# Patient Record
Sex: MALE | Race: WHITE | NOT HISPANIC OR LATINO | Employment: FULL TIME | URBAN - METROPOLITAN AREA
[De-identification: names, ages, dates, MRNs, and addresses within clinical notes are randomized per-mention and may not be internally consistent; named-entity substitution may affect disease eponyms.]

---

## 2019-03-25 ENCOUNTER — CONVERSION ENCOUNTER (OUTPATIENT)
Dept: FAMILY MEDICINE CLINIC | Facility: CLINIC | Age: 59
End: 2019-03-25

## 2019-03-25 ENCOUNTER — HOSPITAL ENCOUNTER (OUTPATIENT)
Dept: FAMILY MEDICINE CLINIC | Facility: CLINIC | Age: 59
Discharge: HOME | End: 2019-03-25
Attending: FAMILY MEDICINE

## 2019-03-25 ENCOUNTER — OFFICE VISIT CONVERTED (OUTPATIENT)
Dept: FAMILY MEDICINE CLINIC | Facility: CLINIC | Age: 59
End: 2019-03-25
Attending: FAMILY MEDICINE

## 2019-03-25 LAB
ALBUMIN SERPL-MCNC: 4.3 G/DL (ref 3.5–5)
ALBUMIN/GLOB SERPL: 1.5 {RATIO} (ref 1.4–2.6)
ALP SERPL-CCNC: 96 U/L (ref 56–119)
ALT SERPL-CCNC: 18 U/L (ref 10–40)
ANION GAP SERPL CALC-SCNC: 16 MMOL/L (ref 8–19)
AST SERPL-CCNC: 20 U/L (ref 15–50)
BASOPHILS # BLD AUTO: 0.05 10*3/UL (ref 0–0.2)
BASOPHILS NFR BLD AUTO: 0.7 % (ref 0–3)
BILIRUB SERPL-MCNC: 0.62 MG/DL (ref 0.2–1.3)
BUN SERPL-MCNC: 14 MG/DL (ref 5–25)
BUN/CREAT SERPL: 13 {RATIO} (ref 6–20)
CALCIUM SERPL-MCNC: 9.2 MG/DL (ref 8.7–10.4)
CHLORIDE SERPL-SCNC: 107 MMOL/L (ref 99–111)
CHOLEST SERPL-MCNC: 194 MG/DL (ref 107–200)
CHOLEST/HDLC SERPL: 5.4 {RATIO} (ref 3–6)
CONV ABS IMM GRAN: 0.02 10*3/UL (ref 0–0.2)
CONV CO2: 26 MMOL/L (ref 22–32)
CONV IMMATURE GRAN: 0.3 % (ref 0–1.8)
CONV TOTAL PROTEIN: 7.1 G/DL (ref 6.3–8.2)
CREAT UR-MCNC: 1.1 MG/DL (ref 0.7–1.2)
DEPRECATED RDW RBC AUTO: 42.6 FL (ref 35.1–43.9)
EOSINOPHIL # BLD AUTO: 0.09 10*3/UL (ref 0–0.7)
EOSINOPHIL # BLD AUTO: 1.2 % (ref 0–7)
ERYTHROCYTE [DISTWIDTH] IN BLOOD BY AUTOMATED COUNT: 12.6 % (ref 11.6–14.4)
GFR SERPLBLD BASED ON 1.73 SQ M-ARVRAT: >60 ML/MIN/{1.73_M2}
GLOBULIN UR ELPH-MCNC: 2.8 G/DL (ref 2–3.5)
GLUCOSE SERPL-MCNC: 109 MG/DL (ref 70–99)
HBA1C MFR BLD: 15.7 G/DL (ref 14–18)
HCT VFR BLD AUTO: 48.8 % (ref 42–52)
HDLC SERPL-MCNC: 36 MG/DL (ref 40–60)
LDLC SERPL CALC-MCNC: 137 MG/DL (ref 70–100)
LYMPHOCYTES # BLD AUTO: 2.24 10*3/UL (ref 1–5)
MCH RBC QN AUTO: 29.7 PG (ref 27–31)
MCHC RBC AUTO-ENTMCNC: 32.2 G/DL (ref 33–37)
MCV RBC AUTO: 92.2 FL (ref 80–96)
MONOCYTES # BLD AUTO: 0.51 10*3/UL (ref 0.2–1.2)
MONOCYTES NFR BLD AUTO: 7 % (ref 3–10)
NEUTROPHILS # BLD AUTO: 4.35 10*3/UL (ref 2–8)
NEUTROPHILS NFR BLD AUTO: 59.9 % (ref 30–85)
NRBC CBCN: 0 % (ref 0–0.7)
OSMOLALITY SERPL CALC.SUM OF ELEC: 301 MOSM/KG (ref 273–304)
PLATELET # BLD AUTO: 248 10*3/UL (ref 130–400)
PMV BLD AUTO: 10.9 FL (ref 9.4–12.4)
POTASSIUM SERPL-SCNC: 4.4 MMOL/L (ref 3.5–5.3)
RBC # BLD AUTO: 5.29 10*6/UL (ref 4.7–6.1)
SODIUM SERPL-SCNC: 145 MMOL/L (ref 135–147)
TRIGL SERPL-MCNC: 106 MG/DL (ref 40–150)
VARIANT LYMPHS NFR BLD MANUAL: 30.9 % (ref 20–45)
VLDLC SERPL-MCNC: 21 MG/DL (ref 5–37)
WBC # BLD AUTO: 7.26 10*3/UL (ref 4.8–10.8)

## 2019-08-19 ENCOUNTER — OFFICE VISIT CONVERTED (OUTPATIENT)
Dept: FAMILY MEDICINE CLINIC | Facility: CLINIC | Age: 59
End: 2019-08-19
Attending: NURSE PRACTITIONER

## 2020-10-20 ENCOUNTER — OFFICE VISIT CONVERTED (OUTPATIENT)
Dept: FAMILY MEDICINE CLINIC | Facility: CLINIC | Age: 60
End: 2020-10-20
Attending: FAMILY MEDICINE

## 2020-10-20 ENCOUNTER — HOSPITAL ENCOUNTER (OUTPATIENT)
Dept: FAMILY MEDICINE CLINIC | Facility: CLINIC | Age: 60
Discharge: HOME | End: 2020-10-20
Attending: FAMILY MEDICINE

## 2020-10-20 LAB
ALBUMIN SERPL-MCNC: 4.2 G/DL (ref 3.5–5)
ALBUMIN/GLOB SERPL: 1.4 {RATIO} (ref 1.4–2.6)
ALP SERPL-CCNC: 108 U/L (ref 56–155)
ALT SERPL-CCNC: 21 U/L (ref 10–40)
ANION GAP SERPL CALC-SCNC: 18 MMOL/L (ref 8–19)
AST SERPL-CCNC: 23 U/L (ref 15–50)
BASOPHILS # BLD AUTO: 0.04 10*3/UL (ref 0–0.2)
BASOPHILS NFR BLD AUTO: 0.5 % (ref 0–3)
BILIRUB SERPL-MCNC: 0.62 MG/DL (ref 0.2–1.3)
BUN SERPL-MCNC: 16 MG/DL (ref 5–25)
BUN/CREAT SERPL: 15 {RATIO} (ref 6–20)
CALCIUM SERPL-MCNC: 9.3 MG/DL (ref 8.7–10.4)
CHLORIDE SERPL-SCNC: 106 MMOL/L (ref 99–111)
CHOLEST SERPL-MCNC: 194 MG/DL (ref 107–200)
CHOLEST/HDLC SERPL: 5.4 {RATIO} (ref 3–6)
CONV ABS IMM GRAN: 0.02 10*3/UL (ref 0–0.2)
CONV CO2: 24 MMOL/L (ref 22–32)
CONV IMMATURE GRAN: 0.2 % (ref 0–1.8)
CONV RHEUMATOID FACTOR IGM: <10 [IU]/ML (ref 0–14)
CONV TOTAL PROTEIN: 7.2 G/DL (ref 6.3–8.2)
CREAT UR-MCNC: 1.06 MG/DL (ref 0.7–1.2)
DEPRECATED RDW RBC AUTO: 42.4 FL (ref 35.1–43.9)
EOSINOPHIL # BLD AUTO: 0.13 10*3/UL (ref 0–0.7)
EOSINOPHIL # BLD AUTO: 1.6 % (ref 0–7)
ERYTHROCYTE [DISTWIDTH] IN BLOOD BY AUTOMATED COUNT: 12.4 % (ref 11.6–14.4)
GFR SERPLBLD BASED ON 1.73 SQ M-ARVRAT: >60 ML/MIN/{1.73_M2}
GLOBULIN UR ELPH-MCNC: 3 G/DL (ref 2–3.5)
GLUCOSE SERPL-MCNC: 114 MG/DL (ref 70–99)
HCT VFR BLD AUTO: 48.9 % (ref 42–52)
HDLC SERPL-MCNC: 36 MG/DL (ref 40–60)
HGB BLD-MCNC: 15.8 G/DL (ref 14–18)
LDLC SERPL CALC-MCNC: 132 MG/DL (ref 70–100)
LYMPHOCYTES # BLD AUTO: 2.2 10*3/UL (ref 1–5)
LYMPHOCYTES NFR BLD AUTO: 26.5 % (ref 20–45)
MCH RBC QN AUTO: 29.8 PG (ref 27–31)
MCHC RBC AUTO-ENTMCNC: 32.3 G/DL (ref 33–37)
MCV RBC AUTO: 92.3 FL (ref 80–96)
MONOCYTES # BLD AUTO: 0.63 10*3/UL (ref 0.2–1.2)
MONOCYTES NFR BLD AUTO: 7.6 % (ref 3–10)
NEUTROPHILS # BLD AUTO: 5.29 10*3/UL (ref 2–8)
NEUTROPHILS NFR BLD AUTO: 63.6 % (ref 30–85)
NRBC CBCN: 0 % (ref 0–0.7)
OSMOLALITY SERPL CALC.SUM OF ELEC: 298 MOSM/KG (ref 273–304)
PLATELET # BLD AUTO: 242 10*3/UL (ref 130–400)
PMV BLD AUTO: 10.4 FL (ref 9.4–12.4)
POTASSIUM SERPL-SCNC: 5 MMOL/L (ref 3.5–5.3)
PSA SERPL-MCNC: 0.86 NG/ML (ref 0–4)
RBC # BLD AUTO: 5.3 10*6/UL (ref 4.7–6.1)
SODIUM SERPL-SCNC: 143 MMOL/L (ref 135–147)
TRIGL SERPL-MCNC: 128 MG/DL (ref 40–150)
URATE SERPL-MCNC: 6.5 MG/DL (ref 3.5–8.5)
VLDLC SERPL-MCNC: 26 MG/DL (ref 5–37)
WBC # BLD AUTO: 8.31 10*3/UL (ref 4.8–10.8)

## 2020-10-21 LAB
DSDNA AB SER-ACNC: NEGATIVE [IU]/ML
ENA AB SER IA-ACNC: NEGATIVE {RATIO}

## 2020-11-16 ENCOUNTER — CONVERSION ENCOUNTER (OUTPATIENT)
Dept: GASTROENTEROLOGY | Facility: CLINIC | Age: 60
End: 2020-11-16
Attending: INTERNAL MEDICINE

## 2021-04-23 ENCOUNTER — HOSPITAL ENCOUNTER (OUTPATIENT)
Dept: GASTROENTEROLOGY | Facility: HOSPITAL | Age: 61
Setting detail: HOSPITAL OUTPATIENT SURGERY
Discharge: HOME | End: 2021-04-23
Attending: INTERNAL MEDICINE

## 2021-05-07 NOTE — PROGRESS NOTES
Progress Note      Patient Name: Sumeet Gallego   Patient ID: [id number]   Sex: Male   YOB: 1960        Visit Date: March 25, 2019    Provider: Edwin Mcghee MD   Location: [facility]   Location Address: [address] [place] [address]  [id number]   Location Phone: [phone]          Chief Complaint     To be established       History Of Present Illness  Smueet Gallego is a 59 year old /White male who presents for evaluation and treatment of:      pt needs to get established  pt had PSA checked in Sept 2018- normal  last colonoscopy- 2017- repeat in 2020  HTN- uncontrolled       Past Medical History  Disease Name Date Onset Notes   Kidney stones --  --    Seasonal allergies --  --    Sleep apnea --  --          Medication List  Name Date Started Instructions   aspirin 325 mg oral tablet  take 1 tablet (325 mg) by oral route once daily   Prevacid 30 mg oral capsule,delayed release(DR/EC)  take 1 capsule (30 mg) by oral route once daily before a meal   sildenafil 50 mg oral tablet  take 1 tablet (50 mg) by oral route once daily as needed approximately 1 hour before sexual activity         Allergy List  Allergen Name Date Reaction Notes   NO KNOWN DRUG ALLERGIES --  --  --          Family Medical History  Disease Name Relative/Age Notes   DM Type I Mother/  Sister/   Mother; Sister   Hyperlipidemia Mother/   Mother   Hypertension Mother/   Mother   Arthrtis Mother/   Mother         Social History  Finding Status Start/Stop Quantity Notes   Alcohol Current - status unknown --/-- --  7 or less drinks per week   Tobacco Never --/-- --  --    Uses seatbelts --  --/-- --  yes         Review of Systems  · Constitutional  o Denies  o : fatigue, fever  · Cardiovascular  o Denies  o : chest pain, palpitations  · Respiratory  o Denies  o : shortness of breath, cough  · Gastrointestinal  o Denies  o : nausea, vomiting, diarrhea  · Psychiatric  o Denies  o :  "anxiety, depression      Vitals  Date Time BP Position Site L\R Cuff Size HR RR TEMP (F) WT  HT  BMI kg/m2 BSA m2 O2 Sat HC       03/25/2019 10:54 /98 Sitting    80 - R  97.8 272lbs 4oz 5'  10\" 39.06 2.47 95 %    03/25/2019 11:26 /86 Sitting                     Physical Examination  · Constitutional  o Appearance  o : well developed, well-nourished, in no acute distress  · Respiratory  o Respiratory Effort  o : breathing unlabored  o Auscultation of Lungs  o : clear to ascultation  · Cardiovascular  o Heart  o :   § Auscultation of Heart  § : regular rate and rhythm  o Peripheral Vascular System  o :   § Extremities  § : no edema  · Gastrointestinal  o Abdomen  o : soft, non-tender, non-distended, + bowel sounds, no hepatosplenomegaly, no masses palpated  · Musculoskeletal  o General  o :   § General Musculoskeletal  § : No joint swelling or deformity., Muscle tone, strength, and development grossly normal.  · Neurologic  o Gait and Station  o :   § Gait Screening  § : normal gait  · Psychiatric  o Mood and Affect  o : mood normal, affect appropriate          Assessment  · Essential hypertension     401.9/I10      Plan  · Orders  o CBC with Auto Diff [facility] (30606) - 401.9/I10 - 03/25/2019  o CMP [facility] (30066) - 401.9/I10 - 03/25/2019  o Lipid Panel [facility] (50437) - 401.9/I10 - 03/25/2019  o ACO-39: Current medications updated and reviewed () - - 03/25/2019  · Medications  o lisinopril 10 mg oral tablet   SIG: take 1 tablet (10 mg) by oral route once daily for 90 days   DISP: (90) tablets with 1 refills  Prescribed on 03/25/2019     · Instructions  o Patient was educated/instructed on their diagnosis, treatment and medications prior to discharge from the clinic today.            Electronically Signed by: Edwin Mcghee MD -Author on March 25, 2019 11:30:00 AM  "

## 2021-05-07 NOTE — PROGRESS NOTES
Progress Note      Patient Name: Sumeet Gallego   Patient ID: [id number]   Sex: Male   YOB: 1960        Visit Date: August 19, 2019    Provider: CAROLEE Murphy   Location: [facility] [place]   Location Address: [address] [place] [address]   [place]  [id number]   Location Phone: [phone]          Chief Complaint     PATIENT IS HERE FOR COUGHING AND WHEEZING.      THIS HAS BEEN GOING ON FOR AT LEAST A MONTH.      HE HAS TRIED MANY OVER THE COUNTER COUGH AND COLD MEDICATION BUT NOTHING IS WORKING.      HE SAID HE IS NOT ABLE TO SLEEP AT NIGHT BECAUSE OF ALL THE COUGHING.       History Of Present Illness  Sumeet Gallego is a 59 year old /White male who presents for evaluation and treatment of:      URI s/s on and off x 1 month used OTC not helping--    pt no longer on the Lisinopril for his BP_-and actually a productive cough and even wheezes as well--  pt also with the Hx of the allergy induced asthma--and took the shots x 14 yrs--           Past Medical History  Disease Name Date Onset Notes   Kidney stones --  --    Seasonal allergies --  --    Sleep apnea --  --          Medication List  Name Date Started Instructions   aspirin 325 mg oral tablet  take 1 tablet (325 mg) by oral route once daily   lisinopril 10 mg oral tablet 03/25/2019 take 1 tablet (10 mg) by oral route once daily for 90 days   Prevacid 30 mg oral capsule,delayed release(DR/EC)  take 1 capsule (30 mg) by oral route once daily before a meal   sildenafil 50 mg oral tablet 04/04/2019 take 1 tablet (50 mg) by oral route once daily as needed approximately 1 hour before sexual activity for 30 days         Allergy List  Allergen Name Date Reaction Notes   NO KNOWN DRUG ALLERGIES --  --  --          Family Medical History  Disease Name Relative/Age Notes   DM Type I Mother/  Sister/   Mother; Sister   Hyperlipidemia Mother/   Mother   Hypertension Mother/   Mother   Arthrtis Mother/   Mother  "        Social History  Finding Status Start/Stop Quantity Notes   Alcohol Current - status unknown --/-- --  7 or less drinks per week   Tobacco Never --/-- --  --    Uses seatbelts --  --/-- --  yes         Review of Systems  · Constitutional  o Denies  o : fatigue, fever  · HENT  o Admits  o : sinus pain, nasal congestion, nasal discharge, postnasal drip  · Cardiovascular  o Denies  o : chest pain, irregular heart beats  · Respiratory  o Admits  o : shortness of breath, wheezing, cough, abnormal sputum production, productive cough, asthma or wheezing  · Gastrointestinal  o Denies  o : nausea, vomiting  · Heme-Lymph  o Denies  o : petechiae, lymph node enlargement or tenderness  · Allergic-Immunologic  o Denies  o : frequent illnesses      Vitals  Date Time BP Position Site L\R Cuff Size HR RR TEMP (F) WT  HT  BMI kg/m2 BSA m2 O2 Sat HC       08/19/2019 03:48 /82 Sitting    90 - R  97.8 275lbs 3oz 5'  10\" 39.48 2.48 93 %          Physical Examination  · Constitutional  o Appearance  o : well developed, well-nourished, in no acute distress  · Head and Face  o HEENT  o : Unremarkable no sinus tenderness today but red right TM--  · Eyes  o Conjunctivae  o : conjunctivae normal  · Neck  o Inspection/Palpation  o : supple  o Thyroid  o : no thyromegaly  · Respiratory  o Respiratory Effort  o : breathing unlabored  o Auscultation of Lungs  o : clear to ascultation  · Cardiovascular  o Heart  o :   § Auscultation of Heart  § : regular rate and rhythm  o Peripheral Vascular System  o :   § Extremities  § : no edema  · Gastrointestinal  o Abdominal Examination  o :   § Abdomen  § : soft  · Lymphatic  o Neck  o : no lymphadenopathy present  · Musculoskeletal  o General  o :   § General Musculoskeletal  § : No joint swelling or deformity., Muscle tone, strength, and development grossly normal.  · Skin and Subcutaneous Tissue  o General Inspection  o : skin turgor normal, texture normal  · Neurologic  o Gait and " Station  o :   § Gait Screening  § : normal gait  · Psychiatric  o Mood and Affect  o : mood normal, affect appropriate          Results     reviewed the labs       Assessment  · Allergic rhinitis due to allergen     477.9/J30.9  · Bronchitis, acute     466.0/J20.9  · Allergy-induced asthma     493.00/J45.909      Plan  · Orders  o ACO-39: Current medications updated and reviewed () - - 08/19/2019  · Medications  o Medrol (Duarte) 4 mg oral tablets,dose pack   SIG: take as directed for 6 days   DISP: (1) 21 ct dose-pack with 0 refills  Prescribed on 08/19/2019     o amoxicillin 875 mg oral tablet   SIG: take 1 tablet (875 mg) by oral route every 12 hours for 10 days   DISP: (20) tablets with 0 refills  Prescribed on 08/19/2019     o Ventolin HFA 90 mcg/actuation inhalation HFA aerosol inhaler   SIG: inhale 1 - 2 puffs (90 - 180 mcg) by inhalation route every 6 hours as needed for 30 days   DISP: (1) 8 gm canister with 0 refills  Prescribed on 08/19/2019     o Singulair 10 mg oral tablet   SIG: take 1 tablet (10 mg) by oral route once daily in the evening for 14 days   DISP: (14) tablets with 0 refills  Prescribed on 08/19/2019     · Instructions  o Take all medications as prescribed/directed.  o Rest. Increase Fluids.  o Patient was educated/instructed on their diagnosis, treatment and medications prior to discharge from the clinic today.  o Patient instructed to seek medical attention urgently for new or worsening symptoms.  o Call the office with any concerns or questions.  o Chronic conditions reviewed and taken into consideration for today's treatment plan.  · Disposition  o Call or Return if symptoms worsen or persist.            Electronically Signed by: CAROLEE Murphy -Author on August 19, 2019 04:28:36 PM

## 2021-05-07 NOTE — PROGRESS NOTES
Progress Note      Patient Name: Sumeet Gallego   Patient ID: [id number]   Sex: Male   YOB: 1960    Referring Provider: Edwin Mcghee MD    Visit Date: October 20, 2020    Provider: Edwin Mcghee MD   Location: [facility]   Location Address: [address] [place] [address]   [place]  [address]   Location Phone: [phone]          Chief Complaint     Rt knee pain for about a year       History Of Present Illness  Sumeet Gallego is a 60 year old /White male who presents for evaluation and treatment of:      HTN- uncontrolled- pt has been off lisinopril for long period of time  pt has arthritis- chronic x several months  right knee pain x 1 yr- no known injury- aching pain with certain movements of knee  pt needs colonoscopy this year- last one was 3 yrs ago- needed to get new one this year, per GI  xrays:no fxs       Past Medical History  Disease Name Date Onset Notes   Kidney stones --  --    Seasonal allergies --  --    Sleep apnea --  --          Medication List  Name Date Started Instructions   aspirin 325 mg oral tablet  take 1 tablet (325 mg) by oral route once daily   lisinopril 10 mg oral tablet 10/20/2020 take 1 tablet (10 mg) by oral route once daily for 90 days   Viagra 100 mg oral tablet 10/20/2020 take 1 tablet (100 mg) by oral route once daily as needed approximately 1 hour before sexual activity for 90 days         Allergy List  Allergen Name Date Reaction Notes   NO KNOWN DRUG ALLERGIES --  --  --          Family Medical History  Disease Name Relative/Age Notes   DM Type I Mother/  Sister/   Mother; Sister   Hyperlipidemia Mother/   Mother   Hypertension Mother/   Mother   Arthrtis Mother/   Mother         Social History  Finding Status Start/Stop Quantity Notes   Alcohol Current - status unknown --/-- --  7 or less drinks per week   Tobacco Never --/-- --  --    Uses seatbelts --  --/-- --  yes         Review of  "Systems  · Constitutional  o Denies  o : fatigue, fever  · Cardiovascular  o Denies  o : chest pain, palpitations  · Respiratory  o Denies  o : shortness of breath, cough  · Gastrointestinal  o Denies  o : nausea, vomiting, diarrhea  · Musculoskeletal  o Admits  o : joint pain, knee pain  · Psychiatric  o Denies  o : anxiety, depression      Vitals  Date Time BP Position Site L\R Cuff Size HR RR TEMP (F) WT  HT  BMI kg/m2 BSA m2 O2 Sat FR L/min FiO2 HC       10/20/2020 08:43 /90 Sitting    83 - R  97.7 271lbs 0oz 5'  9\" 40.02 2.45 94 %            Physical Examination  · Constitutional  o Appearance  o : well developed, well-nourished, in no acute distress  · Respiratory  o Respiratory Effort  o : breathing unlabored  o Auscultation of Lungs  o : clear to ascultation  · Cardiovascular  o Heart  o :   § Auscultation of Heart  § : regular rate and rhythm  o Peripheral Vascular System  o :   § Extremities  § : no edema  · Gastrointestinal  o Abdomen  o : soft, non-tender, non-distended, + bowel sounds, no hepatosplenomegaly, no masses palpated  · Musculoskeletal  o General  o :   § General Musculoskeletal  § : No joint swelling or deformity., Muscle tone, strength, and development grossly normal. Right knee medial tenderness, mild crepitus, normal ROM, stable, no redness, warmth. mild swelling.  · Neurologic  o Gait and Station  o :   § Gait Screening  § : normal gait  · Psychiatric  o Mood and Affect  o : mood normal, affect appropriate          Assessment  · Essential hypertension     401.9/I10  · Screen for colon cancer     V76.51/Z12.11  · Arthritis     716.90/M19.90  · Screening PSA (prostate specific antigen)     V76.44/Z12.5  · Right knee pain     719.46/M25.561      Plan  · Orders  o CBC with Auto Diff [facility] (17506) - 401.9/I10 - 10/20/2020  o CMP [facility] (74117) - 401.9/I10 - 10/20/2020  o Lipid Panel [facility] (63231) - 401.9/I10 - 10/20/2020  o Flucelvax Quadrivalent Syringes [facility] (47106) - - 10/21/2020  o ACO-14: " Influenza immunization administered or previously received () - - 10/21/2020  o ACO-39: Current medications updated and reviewed (1159F, ) - - 10/20/2020  o Gastroenterology Consultation (GASTR) - V76.51/Z12.11 - 10/20/2020   needs screening colonoscopy- no changes in stool, per pt  o PSA Ultrasensitive, ANNUAL SCREENING [facility] (12490) - V76.44/Z12.5 - 10/20/2020  o Fluzone Quadrivalent Vaccine, age 3+ (55740) - - 10/20/2020   Vaccine - Influenza; Dose: 0.5; Site: Right Upper Arm; Route: Intramuscular; Date: 10/21/2020 10:06:00; Exp: 06/30/2021; Lot: [id number]; Mfg: sanofi pasteur; TradeName: FLUZONE; Administered By: Dora Pineda MA; Comment: NDC#[phone]   GIVEN 10/20/2020 [facility]   Vaccine - Influenza; Dose: 0.5; Site: Right Upper Arm; Route: Intramuscular; Date: 10/21/2020 10:06:00; Exp: 06/30/2021; Lot: [id number]; Mfg: sanofi pasteur; TradeName: FLUZONE; Administered By: Dora Pineda MA; Comment: NDC#[phone]  o Xray knee right [facility] (25582-ZR) - 719.46/M25.561 - 10/20/2020  o ORTHOPEDIC CONSULTATION (ORTHO) - 719.46/M25.561 - 10/20/2020  o ELEUTERIO (antinuclear antibody profile) by enzyme immunoassay (40312) - 716.90/M19.90 - 10/20/2020  o Rheumatoid Factor IgM Quantitative [facility] (26176) - 716.90/M19.90 - 10/20/2020  o Uric Acid Serum [facility] (33349) - 716.90/M19.90 - 10/20/2020  · Medications  o Viagra 100 mg oral tablet   SIG: take 1 tablet (100 mg) by oral route once daily as needed approximately 1 hour before sexual activity for 90 days   DISP: (36) Tablet with 3 refills  Prescribed on 10/20/2020     o Mobic 15 mg oral tablet   SIG: take 1 tablet (15 mg) by oral route once daily for 14 days   DISP: (14) Tablet with 0 refills  Prescribed on 10/20/2020     o lisinopril 10 mg oral tablet   SIG: take 1 tablet (10 mg) by oral route once daily for 90 days   DISP: (90) Tablet with 1 refills  Adjusted on 10/20/2020     o sildenafil 50 mg oral tablet   SIG: take 1 tablet (50 mg) by oral route once daily  as needed approximately 1 hour before sexual activity for 30 days   DISP: (18) tablet with 0 refills  Discontinued on 10/20/2020     o Medications have been Reconciled  o Transition of Care or Provider Policy  · Instructions  o Handouts were given to patient: FLU VACCINE VIS HANDOUT  o Patient was educated/instructed on their diagnosis, treatment and medications prior to discharge from the clinic today.  o Pt given knee brace hinged.            Electronically Signed by: Edwin Mcghee MD -Author on October 21, 2020 03:54:42 PM

## 2021-05-09 VITALS
BODY MASS INDEX: 38.98 KG/M2 | HEIGHT: 70 IN | TEMPERATURE: 97.8 F | DIASTOLIC BLOOD PRESSURE: 98 MMHG | HEART RATE: 80 BPM | SYSTOLIC BLOOD PRESSURE: 140 MMHG | SYSTOLIC BLOOD PRESSURE: 140 MMHG | WEIGHT: 272.25 LBS | DIASTOLIC BLOOD PRESSURE: 86 MMHG | OXYGEN SATURATION: 95 %

## 2021-05-09 VITALS
DIASTOLIC BLOOD PRESSURE: 82 MMHG | OXYGEN SATURATION: 93 % | HEIGHT: 70 IN | SYSTOLIC BLOOD PRESSURE: 142 MMHG | WEIGHT: 275.18 LBS | TEMPERATURE: 97.8 F | HEART RATE: 90 BPM | BODY MASS INDEX: 39.4 KG/M2

## 2021-05-09 VITALS
WEIGHT: 271 LBS | OXYGEN SATURATION: 94 % | HEIGHT: 69 IN | TEMPERATURE: 97.7 F | SYSTOLIC BLOOD PRESSURE: 150 MMHG | DIASTOLIC BLOOD PRESSURE: 90 MMHG | HEART RATE: 83 BPM | BODY MASS INDEX: 40.14 KG/M2

## 2021-05-10 NOTE — H&P
History and Physical      Patient Name: Sumeet Gallego   Patient ID: [id number]   Sex: Male   YOB: 1960        Visit Date: November 16, 2020    Provider: Kamran Camacho MD   [facility]   Location Address: [address] [place] [address]  [id number]   Location Phone: [phone]          Chief Complaint  · Surgical History and Physical  · Screening Colonoscopy      History Of Present Illness  NON-INPATIENT HISTORY AND PHYSICAL  Allergies: NO KNOWN DRUG ALLERGIES   Chief Complaint/History of Present Illness: Colon screening, History of colon polyps TA, No family history of colon cancer   Colon Recall: Yes How Long: 3 years   Failed Outpatient Treatment/Contraindications: N/A   Current Medications: aspirin 81 mg oral tablet,delayed release (DR/EC), lisinopril 10 mg oral tablet, MoviPrep 100-7.5-2.691 gram oral powder in packet, multivitamin oral tablet, and Viagra 25 mg oral tablet   Significant Past Medical History: *No Pertinent Past Medical History   Significant Family Medical History: No family history of colon cancer   Significant Past Surgical History: Colonoscopy   Previous Colonoscopy: Yes YEAR: 2017 By Whom: Kamran Camacho MD   Previous EGD: No   PHYSICAL EXAM:  Heart: Regular Rate and Rhythm   Lungs: Breathing Unlabored           Assessment  · Preoperative examination     V72.84/Z01.818  · Screening for colon cancer     V76.51/Z12.11      Plan  · Orders  o Consent for Colonoscopy Screening -Possible risk/complications, benefits, and alternatives to surgical or invasive procedure have been explained to patient and/or legal gaurdian. -Patient has been evaluated and can tolerate anethesia and/or sedation. Risk, benefits, and alternatives to anesthesia and sedation have been explained to patient or legal gaurdian. () - V72.84/Z01.818, V76.51/Z12.11 - 01/08/2021  · Medications  o Medications have been Reconciled  o Transition of Care or Provider  Policy  · Instructions  o ****Surgical Orders****  o ***************  o Outpatient  o ***************  o RISK AND BENEFITS:  o Possible risks/complications, benefits and alternatives to surgical or invasive procedure have been explained to the patient and/or legal guardian.  o Patient has been evaluated and can tolerate anesthesia and/or sedation. Risks, benefits, and alternatives to anesthesia and sedation have been explained to the patient and/or legal guardian.  o ***************  o PREP: Per protocol  o IV: Per Anesthesia  o The above History and Physical Examination has been completed within 30 days of admission.  o This note has been transcribed by MADONNA Greco MA. I have read and agree with the findings in this note.  o Electronically Identified Patient Education Materials Provided Electronically  · Disposition  o Call or Return if symptoms worsen or persist.            Electronically Signed by: Alana Rodriguez MA -Author on November 16, 2020 10:27:41 AM

## 2021-07-06 ENCOUNTER — OFFICE VISIT (OUTPATIENT)
Dept: FAMILY MEDICINE CLINIC | Facility: CLINIC | Age: 61
End: 2021-07-06

## 2021-07-06 VITALS
WEIGHT: 275 LBS | SYSTOLIC BLOOD PRESSURE: 180 MMHG | DIASTOLIC BLOOD PRESSURE: 75 MMHG | HEART RATE: 65 BPM | OXYGEN SATURATION: 98 % | BODY MASS INDEX: 40.73 KG/M2 | HEIGHT: 69 IN

## 2021-07-06 DIAGNOSIS — J30.2 SEASONAL ALLERGIES: ICD-10-CM

## 2021-07-06 DIAGNOSIS — J20.9 ACUTE BRONCHITIS, UNSPECIFIED ORGANISM: Primary | ICD-10-CM

## 2021-07-06 PROBLEM — Z87.442 HISTORY OF KIDNEY STONES: Status: ACTIVE | Noted: 2021-07-06

## 2021-07-06 PROCEDURE — 99213 OFFICE O/P EST LOW 20 MIN: CPT | Performed by: FAMILY MEDICINE

## 2021-07-06 RX ORDER — LISINOPRIL 10 MG/1
TABLET ORAL
COMMUNITY
End: 2021-08-13

## 2021-07-06 RX ORDER — ASPIRIN 81 MG/1
TABLET ORAL
COMMUNITY

## 2021-07-06 RX ORDER — BENZONATATE 100 MG/1
100 CAPSULE ORAL 3 TIMES DAILY PRN
Qty: 30 CAPSULE | Refills: 0 | Status: SHIPPED | OUTPATIENT
Start: 2021-07-06 | End: 2021-08-13

## 2021-07-06 RX ORDER — MULTIPLE VITAMINS W/ MINERALS TAB 9MG-400MCG
TAB ORAL
COMMUNITY

## 2021-07-06 RX ORDER — DEXTROMETHORPHAN HYDROBROMIDE AND PROMETHAZINE HYDROCHLORIDE 15; 6.25 MG/5ML; MG/5ML
5 SYRUP ORAL NIGHTLY PRN
Qty: 118 ML | Refills: 0 | Status: SHIPPED | OUTPATIENT
Start: 2021-07-06 | End: 2021-08-13

## 2021-07-06 RX ORDER — LORATADINE 10 MG/1
10 TABLET ORAL NIGHTLY
Qty: 60 TABLET | Refills: 3 | Status: SHIPPED | OUTPATIENT
Start: 2021-07-06 | End: 2021-08-13

## 2021-07-06 RX ORDER — DOXYCYCLINE HYCLATE 100 MG/1
100 TABLET, DELAYED RELEASE ORAL 2 TIMES DAILY
Qty: 10 TABLET | Refills: 0 | Status: SHIPPED | OUTPATIENT
Start: 2021-07-06 | End: 2021-07-11

## 2021-07-06 RX ORDER — ALBUTEROL SULFATE 90 UG/1
2 AEROSOL, METERED RESPIRATORY (INHALATION) EVERY 4 HOURS PRN
Qty: 8 G | Refills: 0 | Status: SHIPPED | OUTPATIENT
Start: 2021-07-06

## 2021-07-06 RX ORDER — SILDENAFIL 100 MG/1
TABLET, FILM COATED ORAL
COMMUNITY
Start: 2021-05-07 | End: 2022-02-07 | Stop reason: SDUPTHER

## 2021-07-06 NOTE — PROGRESS NOTES
"Chief Complaint    Cough (Cough and congestion x one month)    Subjective      Sumeet Gallego presents to [facility]     History of Present Illness    1.) COUGH : Onset - 1 month ago. Per patient - productive with intermittent wheezing. History of allergies - but patient declines any allergy sxs - then reports nasal drainage when coughing. He denies any fevers or chills. ? Hx of asthma - per patient - diagnosed with asthma, while serving in the .    Objective      Vital Signs:     /75   Pulse 65   Ht 175.3 cm (69\")   Wt 125 kg (275 lb)   SpO2 98%   BMI 40.61 kg/m²       Physical Exam  Vitals reviewed.   Constitutional:       General: He is not in acute distress.     Appearance: Normal appearance. He is well-developed.   HENT:      Head: Normocephalic and atraumatic.      Right Ear: Hearing and external ear normal.      Left Ear: Hearing and external ear normal.      Nose: Nose normal.   Eyes:      General: Lids are normal.         Right eye: No discharge.         Left eye: No discharge.      Conjunctiva/sclera: Conjunctivae normal.   Cardiovascular:      Heart sounds: Normal heart sounds.   Pulmonary:      Effort: Pulmonary effort is normal. No respiratory distress.      Breath sounds: No stridor. No wheezing or rhonchi.      Comments: Decreased breath sounds  Abdominal:      General: There is no distension.   Musculoskeletal:         General: No swelling.      Cervical back: Neck supple.   Skin:     Coloration: Skin is not jaundiced.      Findings: No erythema.   Neurological:      Mental Status: He is alert. Mental status is at baseline.   Psychiatric:         Mood and Affect: Mood and affect normal.         Thought Content: Thought content normal.     Assessment and Plan    Diagnoses and all orders for this visit:    1. Acute bronchitis, unspecified organism (Primary)    2. Seasonal allergies    Other orders  -     loratadine (Claritin) 10 MG tablet; Take 1 " tablet by mouth Every Night.  Dispense: 60 tablet; Refill: 3  -     doxycycline (DORYX) 100 MG enteric coated tablet; Take 1 tablet by mouth 2 (Two) Times a Day for 5 days.  Dispense: 10 tablet; Refill: 0  -     albuterol sulfate  (90 Base) MCG/ACT inhaler; Inhale 2 puffs Every 4 (Four) Hours As Needed for Wheezing.  Dispense: 8 g; Refill: 0  -     benzonatate (Tessalon Perles) 100 MG capsule; Take 1 capsule by mouth 3 (Three) Times a Day As Needed for Cough.  Dispense: 30 capsule; Refill: 0  -     promethazine-dextromethorphan (PROMETHAZINE-DM) 6.25-15 MG/5ML syrup; Take 5 mL by mouth At Night As Needed for Cough.  Dispense: 118 mL; Refill: 0    *We will treat acute bronchitis with medications as prescribed. Also recommend starting an antihistamine for seasonal allergies.*    Follow Up     Return if symptoms worsen or fail to improve.     Patient was given instructions and counseling regarding his condition or for health maintenance advice. Please see specific information pulled into the AVS if appropriate.

## 2021-08-13 ENCOUNTER — OFFICE VISIT (OUTPATIENT)
Dept: FAMILY MEDICINE CLINIC | Facility: CLINIC | Age: 61
End: 2021-08-13

## 2021-08-13 VITALS
HEART RATE: 79 BPM | WEIGHT: 275 LBS | OXYGEN SATURATION: 95 % | SYSTOLIC BLOOD PRESSURE: 160 MMHG | DIASTOLIC BLOOD PRESSURE: 100 MMHG | BODY MASS INDEX: 40.61 KG/M2 | TEMPERATURE: 98 F

## 2021-08-13 DIAGNOSIS — R05.9 COUGH: Primary | ICD-10-CM

## 2021-08-13 PROCEDURE — 99214 OFFICE O/P EST MOD 30 MIN: CPT | Performed by: FAMILY MEDICINE

## 2021-08-13 RX ORDER — MONTELUKAST SODIUM 10 MG/1
10 TABLET ORAL NIGHTLY
Qty: 30 TABLET | Refills: 0 | Status: SHIPPED | OUTPATIENT
Start: 2021-08-13 | End: 2021-08-14

## 2021-08-13 RX ORDER — PREDNISONE 20 MG/1
60 TABLET ORAL DAILY
Qty: 15 TABLET | Refills: 0 | Status: SHIPPED | OUTPATIENT
Start: 2021-08-13 | End: 2021-08-18

## 2021-08-13 RX ORDER — LOSARTAN POTASSIUM 50 MG/1
50 TABLET ORAL DAILY
Qty: 30 TABLET | Refills: 0 | Status: SHIPPED | OUTPATIENT
Start: 2021-08-13 | End: 2021-08-14

## 2021-08-13 NOTE — PROGRESS NOTES
Chief Complaint  Cough    Subjective      Sumeet Gallego presents to [facility]     History of Present Illness    1.) COUGH : Onset - 2 months ago. Evaluated here in our office on 7.6.21 - treated as an acute bronchitis. Also treated with Claritin. Patient reports no improvement. He reports a cough worsened at nighttime. He reports rhinitis when coughing. She also reports sudden onset of his cough w/ coughing fits. He denies any fevers or chills. No SOA. No history of smoking, COPD or asthma.     Objective      Vital Signs:     /100   Pulse 79   Temp 98 °F (36.7 °C)   Wt 125 kg (275 lb)   SpO2 95%   BMI 40.61 kg/m²       Physical Exam  Vitals reviewed.   Constitutional:       General: He is not in acute distress.     Appearance: Normal appearance. He is well-developed.   HENT:      Head: Normocephalic and atraumatic.      Right Ear: Hearing and external ear normal.      Left Ear: Hearing and external ear normal.      Nose: Nose normal.   Eyes:      General: Lids are normal.         Right eye: No discharge.         Left eye: No discharge.      Conjunctiva/sclera: Conjunctivae normal.   Pulmonary:      Effort: Pulmonary effort is normal. No respiratory distress.      Breath sounds: No stridor. No wheezing, rhonchi or rales.      Comments: Decreased breath sounds  Abdominal:      General: There is no distension.   Musculoskeletal:         General: No swelling.      Cervical back: Neck supple.   Skin:     Coloration: Skin is not jaundiced.      Findings: No erythema.   Neurological:      Mental Status: He is alert. Mental status is at baseline.   Psychiatric:         Mood and Affect: Mood and affect normal.         Thought Content: Thought content normal.     Assessment and Plan    Diagnoses and all orders for this visit:    1. Cough (Primary)  -     XR Chest PA & Lateral (In Office)    Other orders  -     losartan (Cozaar) 50 MG tablet; Take 1 tablet by mouth Daily.   Dispense: 30 tablet; Refill: 0  -     montelukast (Singulair) 10 MG tablet; Take 1 tablet by mouth Every Night.  Dispense: 30 tablet; Refill: 0  -     predniSONE (DELTASONE) 20 MG tablet; Take 3 tablets by mouth Daily for 5 days.  Dispense: 15 tablet; Refill: 0    ? Medication induced. Patient has been taking Lisinopril for same amount of time, as his cough. He denies a history of GERD. Does not appear allergic, will discontinue Claritin, but will start Singulair at bedtime for ?allergic rhinitis as patient reports 'runny nose' with coughing. Start steroid course as note for ? pneumonitis. Will switch to Losartan. Monitor blood pressure x 2 weeks and return for a re-evaluation.    Follow Up     Return in about 2 weeks (around 8/27/2021) for cough.     Patient was given instructions and counseling regarding his condition or for health maintenance advice. Please see specific information pulled into the AVS if appropriate.

## 2021-08-14 RX ORDER — LOSARTAN POTASSIUM 50 MG/1
50 TABLET ORAL DAILY
Qty: 90 TABLET | Refills: 0 | Status: SHIPPED | OUTPATIENT
Start: 2021-08-14 | End: 2021-09-13

## 2021-08-14 RX ORDER — MONTELUKAST SODIUM 10 MG/1
10 TABLET ORAL NIGHTLY
Qty: 90 TABLET | Refills: 0 | Status: SHIPPED | OUTPATIENT
Start: 2021-08-14 | End: 2021-09-13

## 2021-08-20 ENCOUNTER — TELEPHONE (OUTPATIENT)
Dept: FAMILY MEDICINE CLINIC | Facility: CLINIC | Age: 61
End: 2021-08-20

## 2021-08-20 NOTE — TELEPHONE ENCOUNTER
Caller: Sumeet Gallego    Relationship: Self    Best call back number: [phone]    Medication needed:   Requested Prescriptions      No prescriptions requested or ordered in this encounter   PREDNISONE, DOES NOT KNOW DOSAGE, TAKE 3 TIMES A DAY    When do you need the refill by: ASAP    What additional details did the patient provide when requesting the medication: PATIENT TOOK ALL OF THE MEDICATION FOR WHEEZING AND HE SAID IT REALLY HELPED. HE WOULD LIKE TO KNOW IF HE CAN GET A REFILL TO FULLY CLEAR UP THE WHEEZING. PLEASE CALL PATIENT TO ADVISE.    Does the patient have less than a 3 day supply:  [x] Yes  [] No    What is the patient's preferred pharmacy:      [facility]  [address] [place] [id number] [phone]

## 2021-08-23 RX ORDER — PREDNISONE 20 MG/1
60 TABLET ORAL DAILY
Qty: 15 TABLET | Refills: 0 | Status: SHIPPED | OUTPATIENT
Start: 2021-08-23 | End: 2021-08-28

## 2021-09-13 RX ORDER — MONTELUKAST SODIUM 10 MG/1
10 TABLET ORAL NIGHTLY
Qty: 90 TABLET | Refills: 1 | Status: SHIPPED | OUTPATIENT
Start: 2021-09-13

## 2021-09-13 RX ORDER — LOSARTAN POTASSIUM 50 MG/1
50 TABLET ORAL DAILY
Qty: 90 TABLET | Refills: 1 | Status: SHIPPED | OUTPATIENT
Start: 2021-09-13

## 2021-11-03 ENCOUNTER — CLINICAL SUPPORT (OUTPATIENT)
Dept: FAMILY MEDICINE CLINIC | Facility: CLINIC | Age: 61
End: 2021-11-03

## 2021-11-03 DIAGNOSIS — Z23 NEED FOR INFLUENZA VACCINATION: Primary | ICD-10-CM

## 2021-11-03 PROCEDURE — 90686 IIV4 VACC NO PRSV 0.5 ML IM: CPT | Performed by: NURSE PRACTITIONER

## 2021-11-03 PROCEDURE — 90471 IMMUNIZATION ADMIN: CPT | Performed by: NURSE PRACTITIONER

## 2021-11-30 ENCOUNTER — TELEPHONE (OUTPATIENT)
Dept: FAMILY MEDICINE CLINIC | Facility: CLINIC | Age: 61
End: 2021-11-30

## 2021-11-30 NOTE — TELEPHONE ENCOUNTER
Caller: Sumeet Gallego    Relationship: Self    Best call back number: [phone]    What was the call regarding: PATIENT WOULD LIKE TO KNOW IF HE SHOULD GET THE BOOSTER IF HE HAD THE J AND J VACCINE    Do you require a callback: YES PLEASE CALL AND ADVISE      ”

## 2021-12-02 ENCOUNTER — IMMUNIZATION (OUTPATIENT)
Dept: FAMILY MEDICINE CLINIC | Facility: CLINIC | Age: 61
End: 2021-12-02

## 2021-12-02 DIAGNOSIS — Z23 NEED FOR COVID-19 VACCINE: Primary | ICD-10-CM

## 2021-12-02 PROCEDURE — 0004A COVID-19 (PFIZER): CPT | Performed by: FAMILY MEDICINE

## 2021-12-02 PROCEDURE — 91300 COVID-19 (PFIZER): CPT | Performed by: FAMILY MEDICINE

## 2022-02-07 RX ORDER — SILDENAFIL 100 MG/1
100 TABLET, FILM COATED ORAL AS NEEDED
Qty: 15 TABLET | Refills: 2 | Status: SHIPPED | OUTPATIENT
Start: 2022-02-07 | End: 2022-09-16 | Stop reason: SDUPTHER

## 2022-02-07 NOTE — TELEPHONE ENCOUNTER
Caller: Sumeet Gallego    Relationship: Self    Best call back number: [phone]    Requested Prescriptions:   Requested Prescriptions     Pending Prescriptions Disp Refills   • sildenafil (VIAGRA) 100 MG tablet        Pharmacy where request should be sent:    [facility]  [address] [place], [place] [id number] [phone]    Does the patient have less than a 3 day supply:  [x] Yes  [] No    Dirk Palmer Rep   02/07/22 13:55 EST

## 2022-09-16 RX ORDER — SILDENAFIL 100 MG/1
100 TABLET, FILM COATED ORAL AS NEEDED
Qty: 15 TABLET | Refills: 2 | Status: SHIPPED | OUTPATIENT
Start: 2022-09-16

## 2022-09-16 NOTE — TELEPHONE ENCOUNTER
Caller: Sumeet Gallego    Relationship: Self    Best call back number: [phone]    Requested Prescriptions:   Requested Prescriptions     Pending Prescriptions Disp Refills   • sildenafil (VIAGRA) 100 MG tablet 15 tablet 2     Sig: Take 1 tablet by mouth As Needed for Erectile Dysfunction.        Pharmacy where request should be sent: [id number] [address] AT [facility] [phone]  - [phone]     ADDITIONAL INFO: PLEASE CALL PATIENT WHEN REFILL IS SENT.    Does the patient have less than a 3 day supply:  [x] Yes  [] No    Dirk ROPER Rep   09/16/22 10:47 EDT

## 2023-08-28 RX ORDER — SILDENAFIL 100 MG/1
100 TABLET, FILM COATED ORAL AS NEEDED
Qty: 15 TABLET | Refills: 2 | OUTPATIENT
Start: 2023-08-28

## 2025-05-31 ENCOUNTER — APPOINTMENT (OUTPATIENT)
Dept: GENERAL RADIOLOGY | Facility: HOSPITAL | Age: 65
End: 2025-05-31
Payer: MEDICARE

## 2025-05-31 ENCOUNTER — APPOINTMENT (OUTPATIENT)
Dept: CT IMAGING | Facility: HOSPITAL | Age: 65
End: 2025-05-31
Payer: MEDICARE

## 2025-05-31 ENCOUNTER — APPOINTMENT (OUTPATIENT)
Dept: MRI IMAGING | Facility: HOSPITAL | Age: 65
End: 2025-05-31
Payer: MEDICARE

## 2025-05-31 ENCOUNTER — HOSPITAL ENCOUNTER (OUTPATIENT)
Facility: HOSPITAL | Age: 65
Setting detail: OBSERVATION
Discharge: HOME | End: 2025-06-01
Attending: EMERGENCY MEDICINE | Admitting: INTERNAL MEDICINE
Payer: MEDICARE

## 2025-05-31 DIAGNOSIS — I63.9 ACUTE ISCHEMIC STROKE: ICD-10-CM

## 2025-05-31 DIAGNOSIS — R29.898 RIGHT ARM WEAKNESS: Primary | ICD-10-CM

## 2025-05-31 DIAGNOSIS — R26.2 DIFFICULTY IN WALKING: ICD-10-CM

## 2025-05-31 LAB
ALBUMIN SERPL-MCNC: 4.2 G/DL (ref 3.5–5.2)
ALBUMIN/GLOB SERPL: 1.5 G/DL
ALP SERPL-CCNC: 119 U/L (ref 39–117)
ALT SERPL W P-5'-P-CCNC: 24 U/L (ref 1–41)
ANION GAP SERPL CALCULATED.3IONS-SCNC: 9.1 MMOL/L (ref 5–15)
AST SERPL-CCNC: 20 U/L (ref 1–40)
BASOPHILS # BLD AUTO: 0.06 10*3/MM3 (ref 0–0.2)
BASOPHILS NFR BLD AUTO: 0.6 % (ref 0–1.5)
BILIRUB SERPL-MCNC: 0.6 MG/DL (ref 0–1.2)
BILIRUB UR QL STRIP: NEGATIVE
BUN SERPL-MCNC: 13.9 MG/DL (ref 8–23)
BUN/CREAT SERPL: 12.9 (ref 7–25)
CALCIUM SPEC-SCNC: 9.3 MG/DL (ref 8.6–10.5)
CHLORIDE SERPL-SCNC: 103 MMOL/L (ref 98–107)
CLARITY UR: CLEAR
CO2 SERPL-SCNC: 26.9 MMOL/L (ref 22–29)
COLOR UR: YELLOW
CREAT SERPL-MCNC: 1.08 MG/DL (ref 0.76–1.27)
DEPRECATED RDW RBC AUTO: 40.7 FL (ref 37–54)
EGFRCR SERPLBLD CKD-EPI 2021: 75.7 ML/MIN/1.73
EOSINOPHIL # BLD AUTO: 0.18 10*3/MM3 (ref 0–0.4)
EOSINOPHIL NFR BLD AUTO: 1.7 % (ref 0.3–6.2)
ERYTHROCYTE [DISTWIDTH] IN BLOOD BY AUTOMATED COUNT: 12.4 % (ref 12.3–15.4)
ERYTHROCYTE [SEDIMENTATION RATE] IN BLOOD: 14 MM/HR (ref 0–20)
GLOBULIN UR ELPH-MCNC: 2.8 GM/DL
GLUCOSE SERPL-MCNC: 116 MG/DL (ref 65–99)
GLUCOSE UR STRIP-MCNC: ABNORMAL MG/DL
HCT VFR BLD AUTO: 46.6 % (ref 37.5–51)
HGB BLD-MCNC: 16 G/DL (ref 13–17.7)
HGB UR QL STRIP.AUTO: NEGATIVE
HOLD SPECIMEN: NORMAL
HOLD SPECIMEN: NORMAL
IMM GRANULOCYTES # BLD AUTO: 0.03 10*3/MM3 (ref 0–0.05)
IMM GRANULOCYTES NFR BLD AUTO: 0.3 % (ref 0–0.5)
INR PPP: 0.98 (ref 0.86–1.15)
KETONES UR QL STRIP: NEGATIVE
LEUKOCYTE ESTERASE UR QL STRIP.AUTO: NEGATIVE
LYMPHOCYTES # BLD AUTO: 4.1 10*3/MM3 (ref 0.7–3.1)
LYMPHOCYTES NFR BLD AUTO: 39.3 % (ref 19.6–45.3)
MCH RBC QN AUTO: 30.9 PG (ref 26.6–33)
MCHC RBC AUTO-ENTMCNC: 34.3 G/DL (ref 31.5–35.7)
MCV RBC AUTO: 90.1 FL (ref 79–97)
MONOCYTES # BLD AUTO: 0.85 10*3/MM3 (ref 0.1–0.9)
MONOCYTES NFR BLD AUTO: 8.1 % (ref 5–12)
NEUTROPHILS NFR BLD AUTO: 5.22 10*3/MM3 (ref 1.7–7)
NEUTROPHILS NFR BLD AUTO: 50 % (ref 42.7–76)
NITRITE UR QL STRIP: NEGATIVE
NRBC BLD AUTO-RTO: 0 /100 WBC (ref 0–0.2)
PH UR STRIP.AUTO: 6 [PH] (ref 5–8)
PLATELET # BLD AUTO: 260 10*3/MM3 (ref 140–450)
PMV BLD AUTO: 10.6 FL (ref 6–12)
POTASSIUM SERPL-SCNC: 4.3 MMOL/L (ref 3.5–5.2)
PROT SERPL-MCNC: 7 G/DL (ref 6–8.5)
PROT UR QL STRIP: NEGATIVE
PROTHROMBIN TIME: 13.4 SECONDS (ref 11.8–14.9)
RBC # BLD AUTO: 5.17 10*6/MM3 (ref 4.14–5.8)
SODIUM SERPL-SCNC: 139 MMOL/L (ref 136–145)
SP GR UR STRIP: >1.03 (ref 1–1.03)
UROBILINOGEN UR QL STRIP: ABNORMAL
WBC NRBC COR # BLD AUTO: 10.44 10*3/MM3 (ref 3.4–10.8)
WHOLE BLOOD HOLD COAG: NORMAL
WHOLE BLOOD HOLD SPECIMEN: NORMAL

## 2025-05-31 PROCEDURE — 25510000001 IOPAMIDOL PER 1 ML: Performed by: EMERGENCY MEDICINE

## 2025-05-31 PROCEDURE — 93005 ELECTROCARDIOGRAM TRACING: CPT | Performed by: REGISTERED NURSE

## 2025-05-31 PROCEDURE — 70496 CT ANGIOGRAPHY HEAD: CPT

## 2025-05-31 PROCEDURE — 80053 COMPREHEN METABOLIC PANEL: CPT | Performed by: REGISTERED NURSE

## 2025-05-31 PROCEDURE — 70498 CT ANGIOGRAPHY NECK: CPT

## 2025-05-31 PROCEDURE — 85025 COMPLETE CBC W/AUTO DIFF WBC: CPT | Performed by: REGISTERED NURSE

## 2025-05-31 PROCEDURE — 70450 CT HEAD/BRAIN W/O DYE: CPT

## 2025-05-31 PROCEDURE — G0378 HOSPITAL OBSERVATION PER HR: HCPCS

## 2025-05-31 PROCEDURE — 85610 PROTHROMBIN TIME: CPT | Performed by: REGISTERED NURSE

## 2025-05-31 PROCEDURE — 72141 MRI NECK SPINE W/O DYE: CPT

## 2025-05-31 PROCEDURE — 70551 MRI BRAIN STEM W/O DYE: CPT

## 2025-05-31 PROCEDURE — 85652 RBC SED RATE AUTOMATED: CPT | Performed by: EMERGENCY MEDICINE

## 2025-05-31 PROCEDURE — 99204 OFFICE O/P NEW MOD 45 MIN: CPT

## 2025-05-31 PROCEDURE — 99285 EMERGENCY DEPT VISIT HI MDM: CPT

## 2025-05-31 PROCEDURE — 81003 URINALYSIS AUTO W/O SCOPE: CPT | Performed by: REGISTERED NURSE

## 2025-05-31 PROCEDURE — 99222 1ST HOSP IP/OBS MODERATE 55: CPT | Performed by: INTERNAL MEDICINE

## 2025-05-31 PROCEDURE — 71045 X-RAY EXAM CHEST 1 VIEW: CPT

## 2025-05-31 RX ORDER — SODIUM CHLORIDE 0.9 % (FLUSH) 0.9 %
10 SYRINGE (ML) INJECTION AS NEEDED
Status: DISCONTINUED | OUTPATIENT
Start: 2025-05-31 | End: 2025-06-01 | Stop reason: HOSPADM

## 2025-05-31 RX ORDER — ATORVASTATIN CALCIUM 80 MG/1
80 TABLET, FILM COATED ORAL DAILY
COMMUNITY

## 2025-05-31 RX ORDER — CHLORTHALIDONE 25 MG/1
25 TABLET ORAL DAILY
COMMUNITY
End: 2025-06-01 | Stop reason: HOSPADM

## 2025-05-31 RX ORDER — ASPIRIN 300 MG/1
300 SUPPOSITORY RECTAL DAILY
Status: DISCONTINUED | OUTPATIENT
Start: 2025-05-31 | End: 2025-06-01 | Stop reason: HOSPADM

## 2025-05-31 RX ORDER — ASPIRIN 325 MG
325 TABLET ORAL DAILY
Status: DISCONTINUED | OUTPATIENT
Start: 2025-05-31 | End: 2025-06-01 | Stop reason: HOSPADM

## 2025-05-31 RX ORDER — SODIUM CHLORIDE 9 MG/ML
40 INJECTION, SOLUTION INTRAVENOUS AS NEEDED
Status: DISCONTINUED | OUTPATIENT
Start: 2025-05-31 | End: 2025-06-01 | Stop reason: HOSPADM

## 2025-05-31 RX ORDER — IBUPROFEN 600 MG/1
1 TABLET ORAL
Status: DISCONTINUED | OUTPATIENT
Start: 2025-05-31 | End: 2025-06-01 | Stop reason: HOSPADM

## 2025-05-31 RX ORDER — DEXTROSE MONOHYDRATE 25 G/50ML
25 INJECTION, SOLUTION INTRAVENOUS
Status: DISCONTINUED | OUTPATIENT
Start: 2025-05-31 | End: 2025-06-01 | Stop reason: HOSPADM

## 2025-05-31 RX ORDER — INSULIN LISPRO 100 [IU]/ML
2-7 INJECTION, SOLUTION INTRAVENOUS; SUBCUTANEOUS
Status: DISCONTINUED | OUTPATIENT
Start: 2025-05-31 | End: 2025-06-01 | Stop reason: HOSPADM

## 2025-05-31 RX ORDER — IOPAMIDOL 755 MG/ML
100 INJECTION, SOLUTION INTRAVASCULAR
Status: COMPLETED | OUTPATIENT
Start: 2025-05-31 | End: 2025-05-31

## 2025-05-31 RX ORDER — NICOTINE POLACRILEX 4 MG
15 LOZENGE BUCCAL
Status: DISCONTINUED | OUTPATIENT
Start: 2025-05-31 | End: 2025-06-01 | Stop reason: HOSPADM

## 2025-05-31 RX ORDER — ATORVASTATIN CALCIUM 40 MG/1
80 TABLET, FILM COATED ORAL NIGHTLY
Status: DISCONTINUED | OUTPATIENT
Start: 2025-05-31 | End: 2025-06-01 | Stop reason: HOSPADM

## 2025-05-31 RX ORDER — SODIUM CHLORIDE 0.9 % (FLUSH) 0.9 %
10 SYRINGE (ML) INJECTION EVERY 12 HOURS SCHEDULED
Status: DISCONTINUED | OUTPATIENT
Start: 2025-05-31 | End: 2025-06-01 | Stop reason: HOSPADM

## 2025-05-31 RX ADMIN — IOPAMIDOL 100 ML: 755 INJECTION, SOLUTION INTRAVENOUS at 16:43

## 2025-05-31 NOTE — CONSULTS
[facility]    Stat Consult    Patient Name:   Sumeet Gallego  YOB: 1960  Identification Number:   MRN - [id number]  Date of Service:   05/31/2025 16:21:25    Diagnosis:        R26.81 - Unsteady gait    Impression  66 yo M with DM2, HLD, HTN, and HEIDI who presents with RUE/RLE weakness and clumsiness over the last 2 days. On 5/29 morning, he noted onset of the above symptoms. He notes associated unsteady gait and neck pain/posterior headache. No associated speech changes, facial droop, or dysphagia. CT head showed remote infarcts in the L caudate and L frontoparietal white matter.    RUE/RLE weakness and gait impairment with associated right-sided posterior headache vs neck pain x 2d. Evaluate for stroke and cervical myelopathy (given absence of facial / bulbar symptoms)    --MRI brain wo contrast, CTA head/neck pending. If MRI shows evidence of stroke, recommend TTE with bubble.  --Recommend adding MRI C-spine  --Continue home ASA 81, Lipitor 80mg qHS  --Recommend A1c  --Monitor telemetry for AFib  --Normotensive BP goals  --PT/OT            ----------------------------------------------------------------------------------------------------        Metrics:  Dispatch Time: 05/31/2025 16:19:28  Callback Response Time: 05/31/2025 16:23:14    Primary Provider Notified of Diagnostic Impression and Management Plan on: 05/31/2025 17:07:00          ----------------------------------------------------------------------------------------------------    Chief Complaint:  R-sided weakness/clumsiness, neck pain    History of Present Illness:  Patient is a 65 year old Male.  66 yo M with DM2, HLD, HTN, and HEIDI who presents with RUE/RLE weakness and clumsiness over the last 2 days. On 5/29 morning, he noted onset of the above symptoms. He notes associated unsteady gait and neck pain/posterior headache. No associated speech changes, facial droop, or  dysphagia. CT head showed remote infarcts in the L caudate and L frontoparietal white matter.       Past Medical History:       Hypertension       Diabetes Mellitus       Hyperlipidemia    Medications:    No Anticoagulant use   Antiplatelet use: Yes asa  Reviewed EMR for current medications    Allergies:   Reviewed    Social History:  Smoking: No    Family History:    There is no family history of premature cerebrovascular disease pertinent to this consultation    ROS :  14 Points Review of Systems was performed and was negative except mentioned in HPI.    Past Surgical History:  There Is No Surgical History Contributory To Today’s Visit       Examination:  BP(159/82), Pulse(67),  1A: Level of Consciousness - Alert; keenly responsive + 0  1B: Ask Month and Age - Both Questions Right + 0  1C: Blink Eyes & Squeeze Hands - Performs Both Tasks + 0  2: Test Horizontal Extraocular Movements - Normal + 0  3: Test Visual Fields - No Visual Loss + 0  4: Test Facial Palsy (Use Grimace if Obtunded) - Normal symmetry + 0  5A: Test Left Arm Motor Drift - No Drift for 10 Seconds + 0  5B: Test Right Arm Motor Drift - No Drift for 10 Seconds + 0  6A: Test Left Leg Motor Drift - No Drift for 5 Seconds + 0  6B: Test Right Leg Motor Drift - Drift, but doesn't hit bed + 1  7: Test Limb Ataxia (FNF/Heel-Shin) - No Ataxia + 0  8: Test Sensation - Normal; No sensory loss + 0  9: Test Language/Aphasia - Normal; No aphasia + 0  10: Test Dysarthria - Normal + 0  11: Test Extinction/Inattention - No abnormality + 0    NIHSS Score: 1  NIHSS Free Text : Minimal RUE drift    Spoke with : ED MD        This consult was conducted in real time using interactive audio and video technology. Patient was informed of the technology being used for this visit and agreed to proceed. Patient located in hospital and provider located at home/office setting.    Patient is being evaluated for possible acute neurologic impairment and high probability of imminent  or life - threatening deterioration.I spent total of 35 minutes providing care to this patient, including time for face to face visit via telemedicine, review of medical records, imaging studies and discussion of findings with providers, the patient and / or family.      Dr Partha Doll      [facility]  For Inpatient follow-up with [facility] physician please call [facility] [place] at [phone]. As we are not an outpatient service for any post hospital discharge needs please contact the hospital for assistance.    If you have any questions for the [facility] physicians or need to reconsult for clinical or diagnostic changes please contact us via [facility] [place] at [phone].

## 2025-05-31 NOTE — H&P
[facility] HISTORY AND PHYSICAL  Date: 2025   Patient Name: Sumeet Gallego  : 1960  MRN: [id number]  Primary Care Physician:  Vito Vega DO  Date of admission: 2025    Subjective   Subjective     Chief Complaint: Right hand weakness    HPI:    Sumeet Gallego is a 65 y.o. male past medical history of obesity BMI 41, hypertension, dyslipidemia, obstructive sleep apnea, and type 2 diabetes who presents with weakness in his right hand.    Patient states that he started developing some weakness with his right hand and some difficulty walking due to his right leg on Thursday morning.  It has shown some improvement over time.  He has never had symptoms like this before.  He states he has never had symptoms of a stroke before.  He also notes some pain on the bone right behind his ear.  As result he symptoms again the ER for further evaluation.    In the emergency department the patient's vital signs were as follows: Temperature 98, pulse 64, respiratory is 12, blood pressure 133/69, 96% on room air.  CBC shows no abnormalities.  CMP shows no concerning abnormalities.  Urinalysis is bland.  Chest x-ray shows no active disease.  CT of the head shows atrophy with white matter changes and area of encephalomalacia.  CTA of the head neck shows mild cervical intracranial atherosclerotic changes but no high-grade stenosis or LVO.  MRI of the brain shows multiple areas of encephalomalacia consistent with old infarcts that are stable.  MRI of the cervical spine shows multilevel degenerative changes with severe left foraminal narrowing at C5 and C6 and C2 and C3.  This was discussed with teleneurology.  Unclear why the patient continues to have problems with his right hand even though we see no MRI of the brain the cervical spine.  Patient admitted to hospital for continued workup for recrudescence of old stroke versus TIA versus old strokes.    All systems reviewed and are as noted  above    Personal History     Past Medical History:  Hypertension  Dyslipidemia  Obstructive sleep apnea  Type 2 diabetes    Past Surgical History:  Kidney stone    Family History:   Osteoarthritis  Diabetes    Social History:   Never smoked.  1 drink per day    Home Medications:  aspirin, atorvastatin, chlorthalidone, empagliflozin, multivitamin with minerals, and sildenafil    Allergies:  No Known Allergies      Objective   Objective     Vitals:   Temp:  [98 °F (36.7 °C)-98.4 °F (36.9 °C)] 98 °F (36.7 °C)  Heart Rate:  [64-67] 64  Resp:  [12-18] 12  BP: (133-159)/(69-82) 133/69    Physical Exam    Constitutional: Awake, alert, no acute distress   Eyes: Pupils equal, sclerae anicteric, no conjunctival injection   HENT: NCAT, mucous membranes moist   Neck: Supple, no thyromegaly, no lymphadenopathy, trachea midline   Respiratory: Clear to auscultation bilaterally, nonlabored respirations    Cardiovascular: RRR, no murmurs, rubs, or gallops, palpable pedal pulses bilaterally   Gastrointestinal: Positive bowel sounds, soft, nontender, nondistended   Musculoskeletal: No bilateral ankle edema, no clubbing or cyanosis to extremities   Psychiatric: Appropriate affect, cooperative   Neurologic: Oriented x 3, right hand  is 4 out of 5 and left hand  is 5 out of 5.  Right hip flexion is 4 out of 5 and left hip flexion is 5 out of 5   Skin: No rashes     Result Review    Result Review:  I have personally reviewed the results from the time of this admission to 5/31/2025 18:59 EDT and agree with these findings:  Labs reviewed    Assessment & Plan   Assessment / Plan     Assessment/Plan:   Old ischemic strokes  Recrudescence of old stroke  Cervical stenosis without appropriate symptoms  Type 2 diabetes  Obesity    Plan:  --Admit to hospital service  -- Consult neurology  -- On examination I could palpate painful areas along the scalene muscles and the trapezius.  I could mildly recreate his symptoms by pushing further  down in the trap and also up further on the skull.  At this point the patient deserves to come to the hospital to complete the workup for strokes as he has had these which are seen on CT and MRI.  However this time I am not convinced that his symptoms are related to ischemic stroke.  -- SLP/OT/PT  -- Echocardiogram  -- Telemetry to rule out A-fib  -- Aspirin and statin  -- Sliding scale for type 2 diabetes      VTE Prophylaxis:  SCDs        CODE STATUS:     Full code    Admission Status:  I believe this patient meets observation status.    Electronically signed by Indra Sheikh MD, 05/31/25, 6:59 PM EDT.

## 2025-05-31 NOTE — ED PROVIDER NOTES
Time: 2:06 PM EDT  Date of encounter:  5/31/2025  Independent Historian/Clinical History and Information was obtained by:   Patient    History is limited by: N/A    Chief Complaint   Patient presents with    Numbness    Headache         History of Present Illness:  Patient is a 65 y.o. year old male who presents to the emergency department accompanied by his wife for evaluation of headache and right-sided arm and leg clumsiness and weakness since Thursday morning (greater than 48 hours).  He has difficulty walking.  He thought initially that it might be an ear infection.  However symptoms have not resolved.  No facial droop.  Not on blood thinning medications other than aspirin.    No previous history of stroke.  No difficulty speaking and no visual deficits.    He was trying to write out a check using a pen in his right hand and had difficulty writing due to clumsiness of the right hand and arm today.    Patient Care Team  Primary Care Provider: Vito Vega DO    Past Medical History:     No Known Allergies  Past Medical History:   Diagnosis Date    Diabetes mellitus     Hearing loss     Hyperlipidemia     Hypertension     Seasonal allergies     Sleep apnea      Past Surgical History:   Procedure Laterality Date    KIDNEY STONE SURGERY Right      Family History   Problem Relation Age of Onset    Diabetes type I Mother     Hyperlipidemia Mother     Hypertension Mother     Arthritis Mother     Diabetes type I Sister        Home Medications:  Prior to Admission medications    Medication Sig Start Date End Date Taking? Authorizing Provider   albuterol sulfate  (90 Base) MCG/ACT inhaler Inhale 2 puffs Every 4 (Four) Hours As Needed for Wheezing. 7/6/21   Vito Vega DO   aspirin (aspirin) 81 MG EC tablet aspirin 81 mg oral tablet,delayed release (DR/EC) take 1 tablet (81 mg) by oral route once daily   Active    Provider, MD Taras   losartan (COZAAR) 50 MG tablet TAKE 1 TABLET BY MOUTH DAILY 9/13/21    "Vito Vega DO   montelukast (SINGULAIR) 10 MG tablet TAKE 1 TABLET BY MOUTH EVERY NIGHT 9/13/21   Vito Vega DO   multivitamin with minerals (MULTIVITAMIN ADULT PO) multivitamin oral tablet take 1 tablet by oral route daily   Active    Provider, MD Taras   sildenafil (VIAGRA) 100 MG tablet Take 1 tablet by mouth As Needed for Erectile Dysfunction. 9/16/22   Vito Vega DO        Social History:   Social History     Tobacco Use    Smoking status: Never    Smokeless tobacco: Never   Vaping Use    Vaping status: Never Used   Substance Use Topics    Alcohol use: Yes     Comment: 7 or less drinks per week    Drug use: Never         Review of Systems:  Review of Systems   Neurological:  Positive for weakness, numbness and headaches.        Physical Exam:  /66 (BP Location: Left arm, Patient Position: Lying)   Pulse 64   Temp 97.9 °F (36.6 °C) (Oral)   Resp 18   Ht 175.3 cm (69\")   Wt 126 kg (278 lb)   SpO2 94%   BMI 41.05 kg/m²       General: Awake alert and in no obvious distress    HEENT: Head normocephalic atraumatic, eyes PERRLA EOMI, nose normal, oropharynx normal.    Neck: Supple full range of motion, no meningismus, no lymphadenopathy    Heart: Regular rate and rhythm, no murmurs or rubs, 2+ radial pulses bilaterally    Lungs: Clear to auscultation bilaterally without wheezes or crackles, no respiratory distress    Abdomen: Soft, nontender, nondistended, no rebound or guarding    Skin: Warm, dry, no rash    Musculoskeletal: Normal range of motion, no lower extremity edema    Neurologic: Oriented x3, mild drift in the right leg, no obvious weakness in the right arm or hand, but mild clumsiness, however normal finger-nose-finger and no pronator drift, normal speech and visual fields, no facial droop, no sensory deficits    Psychiatric: Mood appears stable, no psychosis                        Medical Decision Making:      Comorbidities that affect care:    High cholesterol, " Hypertension    External Notes reviewed:    None      The following orders were placed and all results were independently analyzed by me:  Orders Placed This Encounter   Procedures    XR Chest 1 View    CT Head Without Contrast    MRI Brain Without Contrast    CT Angiogram Head    CT Angiogram Neck    MRI Cervical Spine Without Contrast    Comprehensive Metabolic Panel    Protime-INR    Urinalysis With Microscopic If Indicated (No Culture) - Urine, Clean Catch    [place] Draw    CBC Auto Differential    Sedimentation Rate    Hemoglobin A1c    Lipid Panel    Undress & Gown    Vital Signs    Nursing Dysphagia Screening (Complete Prior to Giving anything PO)    Vital Signs    Continuous Pulse Oximetry    Telemetry - Place Orders & Notify Provider of Results When Patient Experiences Acute Chest Pain, Dysrhythmia or Respiratory Distress    Notify Provider    Nursing Dysphagia Screening (Complete Prior to Giving Anything By Mouth)    Neuro Checks    Provide Stroke Education Material    Saline Lock & Maintain IV Access    Place Sequential Compression Device    Activity As Tolerated    Discharge Follow-up with PCP    Discontinue Telemetry    Discharge Follow-up with Specified Provider: Neurology, Dr. Friedman; 1 Month    Activity as Tolerated    Diet: Cardiac Diets, Diabetic Diets; Healthy Heart (2-3 Na+); Regular (IDDSI 7); Thin (IDDSI 0); Consistent Carbohydrate    Inpatient Neurology Consult Stroke    Inpatient Neurology Consult Stroke    PT Consult: Eval & Treat As Tolerated    PT Plan of Care Cert / Re-Cert    POC Glucose Once    ECG 12 Lead Stroke Evaluation    Adult Transthoracic Echo Complete W/ Cont if Necessary Per Protocol (With Agitated Saline)    Initiate Observation Status    Discharge patient    CBC & Differential    Green Top (Gel)    Lavender Top    Gold Top - SST    Light Blue Top    Extra Tubes    Lavender Top       Medications Given in the Emergency Department:  Medications   iopamidol (ISOVUE-370) 76 %  injection 100 mL (100 mL Intravenous Given 5/31/25 1643)   Sulfur Hexafluoride Microsph (LUMASON) 60.7-25 MG IV reconstituted suspension reconstituted suspension 3 mL (3 mL Injection Given 6/1/25 0953)        ED Course:    The patient was initially evaluated in the triage area where orders were placed. The patient was later dispositioned by Huan Weaver MD.      The patient was advised to stay for completion of workup which includes but is not limited to communication of labs and radiological results, reassessment and plan. The patient was advised that leaving prior to disposition by a provider could result in critical findings that are not communicated to the patient.          Labs:    Lab Results (last 24 hours)       Procedure Component Value Units Date/Time    POC Glucose Q6H [[id number]]  (Abnormal) Collected: 06/01/25 0005    Specimen: Blood Updated: 06/01/25 0007     Glucose 200 mg/dL      Comment: Serial Number: [id number]:  [id number]       Hemoglobin A1c [[id number]]  (Abnormal) Collected: 06/01/25 0444    Specimen: Blood Updated: 06/01/25 1416     Hemoglobin A1C 6.20 %     Narrative:      Hemoglobin A1C Ranges:    Increased Risk for Diabetes  5.7% to 6.4%  Diabetes                     >= 6.5%  Diabetic Goal                < 7.0%    Lipid Panel [[id number]]  (Abnormal) Collected: 06/01/25 0444    Specimen: Blood Updated: 06/01/25 0643     Total Cholesterol 125 mg/dL      Comment: Corrected result. Previous result was 124 mg/dL on 6/1/2025 at 0553 EDT.        Triglycerides 174 mg/dL      HDL Cholesterol 26 mg/dL      LDL Cholesterol  69 mg/dL      Comment: Corrected result. Previous result was 68 mg/dL on 6/1/2025 at 0553 EDT.        VLDL Cholesterol 30 mg/dL      LDL/HDL Ratio 2.47     Comment: Corrected result. Previous result was 2.43 on 6/1/2025 at 0553 EDT.       Narrative:      Cholesterol Reference Ranges  (U.S. Department of Health and Human Services ATP III Classifications)    Desirable           <200 mg/dL  Borderline High    200-239 mg/dL  High Risk          >240 mg/dL      Triglyceride Reference Ranges  (U.S. Department of Health and Human Services ATP III Classifications)    Normal           <150 mg/dL  Borderline High  150-199 mg/dL  High             200-499 mg/dL  Very High        >500 mg/dL    HDL Reference Ranges  (U.S. Department of Health and Human Services ATP III Classifications)    Low     <40 mg/dl (major risk factor for CHD)  High    >60 mg/dl ('negative' risk factor for CHD)        LDL Reference Ranges  (U.S. Department of Health and Human Services ATP III Classifications)    Optimal          <100 mg/dL  Near Optimal     100-129 mg/dL  Borderline High  130-159 mg/dL  High             160-189 mg/dL  Very High        >189 mg/dL    LDL is calculated using the NIH LDL-C calculation.      POC Glucose 4x Daily Before Meals & at Bedtime [[id number]]  (Abnormal) Collected: 06/01/25 0823    Specimen: Blood Updated: 06/01/25 0825     Glucose 155 mg/dL      Comment: Serial Number: [id number]:  [id number]       POC Glucose 4x Daily Before Meals & at Bedtime [[id number]]  (Abnormal) Collected: 06/01/25 1244    Specimen: Blood Updated: 06/01/25 1246     Glucose 144 mg/dL      Comment: Serial Number: [id number]:  [id number]                Imaging:    Adult Transthoracic Echo Complete W/ Cont if Necessary Per Protocol (With Agitated Saline)  Result Date: 6/1/2025    Left ventricular systolic function is normal. Calculated left ventricular EF = 62.9%   Left ventricular wall thickness is consistent with borderline concentric hypertrophy.   Left ventricular diastolic function was normal.   Saline test results are negative.   The study is technically difficult for diagnosis.         Differential Diagnosis and Discussion:      Stroke: Differential diagnosis in this patient with signs of possible ischemic stroke include TIA or ischemic stroke, hemorrhagic stroke, hypoglycemic episode, toxic or  metabolic encephalopathy, paresthesias.    PROCEDURES:    Labs were collected in the emergency department and all labs were reviewed and interpreted by me.  X-ray were performed in the emergency department and all X-ray impressions were independently interpreted by me.  An EKG was performed and the EKG was interpreted by me.  CT scan was performed in the emergency department and the CT scan radiology impression was interpreted by me.  MRI was performed in the emergency department and the MRI impression was interpreted by me.     ECG 12 Lead Stroke Evaluation   Preliminary Result   HEART RATE=70  bpm   RR Dthhrkwz=565  ms   TX Gquiygzp=940  ms   P Horizontal Axis=  deg   P Front Axis=-32  deg   QRSD Interval=87  ms   QT Eyyktqda=694  ms   OGyA=233  ms   QRS Axis=-20  deg   T Wave Axis=29  deg   - OTHERWISE NORMAL ECG -   Sinus rhythm   Borderline left axis deviation   Minimal ST depression, lateral leads   Date and Time of Study:2025-05-31 14:32:18           Procedures    MDM     Amount and/or Complexity of Data Reviewed  Clinical lab tests: reviewed  Tests in the radiology section of CPT®: reviewed  Tests in the medicine section of CPT®: reviewed         This patient is a pleasant 65-year-old male with a 2-day history of right arm and leg weakness, difficulty walking, clumsiness of the right hand with difficulty writing.    Clinically it sounds like an acute ischemic stroke most likely but he is outside the window for TNKase as symptoms started greater than 48 hours ago.    Blood sugars at 116 on arrival.    Initial CT scan shows no acute intra cerebral hemorrhage but does show old infarcts.    I will get CTA head and neck to look for any obvious vessel occlusions, and also get MRI of the brain and consult teleneurology to see him for what appears to be a stroke.    Will plan for hospital admission.                    Patient Care Considerations:          Consultants/Shared Management Plan:    Hospitalist: I have  discussed the case with the admitting hospitalist who agrees to accept the patient for admission.  Consultant: I have discussed the case with the on-call teleneurologist who agrees to consult on the patient.    Social Determinants of Health:    Patient is independent, reliable, and has access to care.       Disposition and Care Coordination:    Admit:   Through independent evaluation of the patient's history, physical, and imperical data, the patient meets criteria for inpatient admission to the hospital.        Final diagnoses:   Right arm weakness   Acute ischemic stroke        ED Disposition       ED Disposition   Decision to Admit    Condition   --    Comment   Level of Care: Telemetry [5]   Diagnosis: Ischemic stroke [[id number]]                 This medical record created using voice recognition software.             Huan Weaver MD  06/01/25 9484

## 2025-06-01 ENCOUNTER — READMISSION MANAGEMENT (OUTPATIENT)
Dept: CALL CENTER | Facility: HOSPITAL | Age: 65
End: 2025-06-01
Payer: MEDICARE

## 2025-06-01 ENCOUNTER — APPOINTMENT (OUTPATIENT)
Dept: CARDIOLOGY | Facility: HOSPITAL | Age: 65
End: 2025-06-01
Payer: MEDICARE

## 2025-06-01 VITALS
OXYGEN SATURATION: 94 % | SYSTOLIC BLOOD PRESSURE: 135 MMHG | HEART RATE: 64 BPM | TEMPERATURE: 97.9 F | RESPIRATION RATE: 18 BRPM | HEIGHT: 69 IN | DIASTOLIC BLOOD PRESSURE: 66 MMHG | WEIGHT: 278 LBS | BODY MASS INDEX: 41.18 KG/M2

## 2025-06-01 LAB
AORTIC DIMENSIONLESS INDEX: 0.62 (DI)
AV MEAN PRESS GRAD SYS DOP V1V2: 9 MMHG
AV VMAX SYS DOP: 205 CM/SEC
BH CV ECHO MEAS - AO MAX PG: 16.8 MMHG
BH CV ECHO MEAS - AO ROOT DIAM: 2.8 CM
BH CV ECHO MEAS - AO V2 VTI: 42.3 CM
BH CV ECHO MEAS - AVA(I,D): 2.14 CM2
BH CV ECHO MEAS - EDV(CUBED): 117.6 ML
BH CV ECHO MEAS - EDV(MOD-SP2): 69.9 ML
BH CV ECHO MEAS - EDV(MOD-SP4): 98 ML
BH CV ECHO MEAS - EF(MOD-SP2): 63.4 %
BH CV ECHO MEAS - EF(MOD-SP4): 63.8 %
BH CV ECHO MEAS - ESV(CUBED): 19.7 ML
BH CV ECHO MEAS - ESV(MOD-SP2): 25.6 ML
BH CV ECHO MEAS - ESV(MOD-SP4): 35.5 ML
BH CV ECHO MEAS - FS: 44.9 %
BH CV ECHO MEAS - IVS/LVPW: 1 CM
BH CV ECHO MEAS - IVSD: 1.2 CM
BH CV ECHO MEAS - LA DIMENSION: 4.2 CM
BH CV ECHO MEAS - LAT PEAK E' VEL: 10.7 CM/SEC
BH CV ECHO MEAS - LV DIASTOLIC VOL/BSA (35-75): 40.8 CM2
BH CV ECHO MEAS - LV MASS(C)D: 226.4 GRAMS
BH CV ECHO MEAS - LV MAX PG: 5.5 MMHG
BH CV ECHO MEAS - LV MEAN PG: 3 MMHG
BH CV ECHO MEAS - LV SYSTOLIC VOL/BSA (12-30): 14.8 CM2
BH CV ECHO MEAS - LV V1 MAX: 117 CM/SEC
BH CV ECHO MEAS - LV V1 VTI: 26.1 CM
BH CV ECHO MEAS - LVIDD: 4.9 CM
BH CV ECHO MEAS - LVIDS: 2.7 CM
BH CV ECHO MEAS - LVOT AREA: 3.5 CM2
BH CV ECHO MEAS - LVOT DIAM: 2.1 CM
BH CV ECHO MEAS - LVPWD: 1.2 CM
BH CV ECHO MEAS - MED PEAK E' VEL: 7.2 CM/SEC
BH CV ECHO MEAS - MV A MAX VEL: 97.5 CM/SEC
BH CV ECHO MEAS - MV DEC SLOPE: 466 CM/SEC2
BH CV ECHO MEAS - MV DEC TIME: 0.19 SEC
BH CV ECHO MEAS - MV E MAX VEL: 87.5 CM/SEC
BH CV ECHO MEAS - MV E/A: 0.9
BH CV ECHO MEAS - MV MEAN PG: 1 MMHG
BH CV ECHO MEAS - MV V2 VTI: 36.7 CM
BH CV ECHO MEAS - MVA(VTI): 2.46 CM2
BH CV ECHO MEAS - RVDD: 2.6 CM
BH CV ECHO MEAS - SV(LVOT): 90.4 ML
BH CV ECHO MEAS - SV(MOD-SP2): 44.3 ML
BH CV ECHO MEAS - SV(MOD-SP4): 62.5 ML
BH CV ECHO MEAS - SVI(LVOT): 37.6 ML/M2
BH CV ECHO MEAS - SVI(MOD-SP2): 18.4 ML/M2
BH CV ECHO MEAS - SVI(MOD-SP4): 26 ML/M2
BH CV ECHO MEAS - TAPSE (>1.6): 1.88 CM
BH CV ECHO MEAS - TR MAX PG: 28.5 MMHG
BH CV ECHO MEAS - TR MAX VEL: 267 CM/SEC
BH CV ECHO MEASUREMENTS AVERAGE E/E' RATIO: 9.78
BH CV ECHO SHUNT ASSESSMENT PERFORMED (HIDDEN SCRIPTING): 1
CHOLEST SERPL-MCNC: 125 MG/DL (ref 0–200)
GLUCOSE BLDC GLUCOMTR-MCNC: 144 MG/DL (ref 70–99)
GLUCOSE BLDC GLUCOMTR-MCNC: 155 MG/DL (ref 70–99)
GLUCOSE BLDC GLUCOMTR-MCNC: 200 MG/DL (ref 70–99)
HBA1C MFR BLD: 6.2 % (ref 4.8–5.6)
HDLC SERPL-MCNC: 26 MG/DL (ref 40–60)
IVRT: 77 MS
LDLC SERPL CALC-MCNC: 69 MG/DL (ref 0–100)
LDLC/HDLC SERPL: 2.47 {RATIO}
LV EF BIPLANE MOD: 62.9 %
TRIGL SERPL-MCNC: 174 MG/DL (ref 0–150)
VLDLC SERPL-MCNC: 30 MG/DL (ref 5–40)
WHOLE BLOOD HOLD SPECIMEN: NORMAL

## 2025-06-01 PROCEDURE — 80061 LIPID PANEL: CPT | Performed by: INTERNAL MEDICINE

## 2025-06-01 PROCEDURE — 36415 COLL VENOUS BLD VENIPUNCTURE: CPT | Performed by: INTERNAL MEDICINE

## 2025-06-01 PROCEDURE — 99214 OFFICE O/P EST MOD 30 MIN: CPT | Performed by: PSYCHIATRY & NEUROLOGY

## 2025-06-01 PROCEDURE — G0378 HOSPITAL OBSERVATION PER HR: HCPCS

## 2025-06-01 PROCEDURE — 97161 PT EVAL LOW COMPLEX 20 MIN: CPT

## 2025-06-01 PROCEDURE — 82948 REAGENT STRIP/BLOOD GLUCOSE: CPT | Performed by: INTERNAL MEDICINE

## 2025-06-01 PROCEDURE — 93306 TTE W/DOPPLER COMPLETE: CPT

## 2025-06-01 PROCEDURE — 99239 HOSP IP/OBS DSCHRG MGMT >30: CPT | Performed by: INTERNAL MEDICINE

## 2025-06-01 PROCEDURE — 83036 HEMOGLOBIN GLYCOSYLATED A1C: CPT | Performed by: INTERNAL MEDICINE

## 2025-06-01 PROCEDURE — 25010000002 SULFUR HEXAFLUORIDE MICROSPH 60.7-25 MG RECONSTITUTED SUSPENSION: Performed by: INTERNAL MEDICINE

## 2025-06-01 PROCEDURE — 93306 TTE W/DOPPLER COMPLETE: CPT | Performed by: INTERNAL MEDICINE

## 2025-06-01 PROCEDURE — 63710000001 INSULIN LISPRO (HUMAN) PER 5 UNITS: Performed by: INTERNAL MEDICINE

## 2025-06-01 RX ADMIN — ATORVASTATIN CALCIUM 80 MG: 40 TABLET, FILM COATED ORAL at 00:21

## 2025-06-01 RX ADMIN — Medication 10 ML: at 00:23

## 2025-06-01 RX ADMIN — SULFUR HEXAFLUORIDE 3 ML: KIT at 09:53

## 2025-06-01 RX ADMIN — ASPIRIN 325 MG: 325 TABLET ORAL at 09:13

## 2025-06-01 RX ADMIN — INSULIN LISPRO 2 UNITS: 100 INJECTION, SOLUTION INTRAVENOUS; SUBCUTANEOUS at 09:14

## 2025-06-01 RX ADMIN — Medication 10 ML: at 09:14

## 2025-06-01 RX ADMIN — INSULIN LISPRO 3 UNITS: 100 INJECTION, SOLUTION INTRAVENOUS; SUBCUTANEOUS at 00:23

## 2025-06-01 RX ADMIN — ASPIRIN 325 MG: 325 TABLET ORAL at 00:22

## 2025-06-01 NOTE — PLAN OF CARE
Goal Outcome Evaluation:  Plan of Care Reviewed With: patient        Progress: no change  Outcome Evaluation: Pt is at prior level of function therefore no skilled PT intervention is needed at this time.  Pt was independent with ambulation and transfers.  Will DC PT at this time.    Anticipated Discharge Disposition (PT): home

## 2025-06-01 NOTE — OUTREACH NOTE
Prep Survey      Flowsheet Row Responses   [employer] patient discharged from? [facility]   Is LACE score < 7 ? Yes   Eligibility [facility]   Date of Admission 05/31/25   Date of Discharge 06/01/25   Discharge Disposition Home or Self Care   Discharge diagnosis Ischemic stroke   Does the patient have one of the following disease processes/diagnoses(primary or secondary)? Stroke   Does the patient have Home health ordered? No   Is there a DME ordered? No   Prep survey completed? Yes            Lawanda ZIMMERMAN - Registered Nurse

## 2025-06-01 NOTE — PLAN OF CARE
Goal Outcome Evaluation:  Plan of Care Reviewed With: patient        Progress: no change  Outcome Evaluation: Patient has been sleeping thoughout the night with no complaints or signs of distress. Will continue with plan of care.

## 2025-06-01 NOTE — DISCHARGE SUMMARY
[facility]         HOSPITALIST  DISCHARGE SUMMARY    Patient Name: Sumeet Gallego  : 1960  MRN: [id number]    Date of Admission: 2025  Date of Discharge:  2025  Primary Care Physician: Vito Vega DO    Consults:  Neurology    Active and Resolved Hospital Problems:  Old ischemic strokes  Recrudescence of old stroke  Cervical stenosis without appropriate symptoms  Type 2 diabetes  Hypertension  Obesity      Hospital Course     Hospital Course:  Sumeet Gallego is a 65 y.o. male with medical history of obesity BMI 41, hypertension, dyslipidemia, obstructive sleep apnea, and type 2 diabetes who presents with numbness in right upper and lower extremity.     Patient states that he started developing some weakness with his right hand and some difficulty walking due to his right leg on Thursday morning.  It has shown some improvement over time.  He has never had symptoms like this before.  He states he has never had symptoms of a stroke before.  He also notes some pain on the bone right behind his ear.  As result he symptoms again the ER for further evaluation. CT of the head shows atrophy with white matter changes and area of encephalomalacia.  CTA of the head neck shows mild cervical intracranial atherosclerotic changes but no high-grade stenosis or LVO.  MRI of the brain shows multiple areas of encephalomalacia consistent with old infarcts stable.  MRI of the cervical spine shows multilevel degenerative changes with severe left foraminal narrowing at C5 and C6 and C2 and C3.  This was discussed with teleneurology.  Unclear why the patient continues to have problems with his right hand even though we see no MRI of the brain the cervical spine.  Patient admitted to hospital for continued workup for recrudescence of old stroke versus TIA versus old strokes.  Patient evaluated by neurology following day as well.  As these are all old strokes, no recommendations to adjust patient's  current medication regiment of aspirin 81 mg and atorvastatin 80.  Patient recently was changed on his antihypertensives, switched off of losartan and started on chlorthalidone.  In the hospital patient with some low blood pressure readings, as low as 105/50.  Recommendations for patient to stop chlorthalidone, resume his home losartan, continue monitoring blood pressure closely at home and follow-up with PCP as soon as possible.  Recommendations to follow-up with neurology as an outpatient as well.  Patient seen on date of discharge, clinically and hemodynamically stable.  Patient provided concerning signs and symptoms prompting immediate medical attention, patient understanding and agreeable    DISCHARGE Follow Up Recommendations for labs and diagnostics:   Follow-up primary care physician as soon as possible  Referral placed for neurology      Day of Discharge     Vital Signs:  Temp:  [97.9 °F (36.6 °C)-98.4 °F (36.9 °C)] 97.9 °F (36.6 °C)  Heart Rate:  [54-64] 64  Resp:  [12-18] 18  BP: (105-138)/(47-70) 135/66  Physical Exam:               Constitutional: Awake, alert, no acute distress              Eyes: Pupils equal, sclerae anicteric, no conjunctival injection              HENT: NCAT, mucous membranes moist              Neck: Supple, no thyromegaly, no lymphadenopathy, trachea midline              Respiratory: Clear to auscultation bilaterally, nonlabored respirations               Cardiovascular: RRR, no murmurs, rubs, or gallops, palpable pedal pulses bilaterally              Gastrointestinal: Positive bowel sounds, soft, nontender, nondistended              Musculoskeletal: No bilateral ankle edema, no clubbing or cyanosis to extremities              Neurologic: Oriented x 3, strength equal 5 out of 5 in all 4 extremities.  Sensation intact throughout       Discharge Details        Discharge Medications        Continue These Medications        Instructions Start Date   aspirin 81 MG EC tablet   Take 1  tablet by mouth Daily.      atorvastatin 80 MG tablet  Commonly known as: LIPITOR   80 mg, Daily      empagliflozin 25 MG tablet tablet  Commonly known as: JARDIANCE   25 mg, Daily      multivitamin with minerals tablet tablet   Take 1 tablet by mouth Daily.      sildenafil 100 MG tablet  Commonly known as: VIAGRA   100 mg, Oral, As Needed             Stop These Medications      chlorthalidone 25 MG tablet  Commonly known as: HYGROTON              No Known Allergies    Discharge Disposition:  Home or Self Care    Diet:  Hospital:No active diet order      Discharge Activity:   Activity Instructions       Activity as Tolerated              CODE STATUS:  Code Status and Medical Interventions: CPR (Attempt to Resuscitate); Full Support   Ordered at: 05/31/25 1940     Code Status (Patient has no pulse and is not breathing):    CPR (Attempt to Resuscitate)     Medical Interventions (Patient has pulse or is breathing):    Full Support     Level Of Support Discussed With:    Patient         No future appointments.    Additional Instructions for the Follow-ups that You Need to Schedule       Discharge Follow-up with PCP   As directed       Currently Documented PCP:    Vito Vega DO    PCP Phone Number:    [phone]     Follow Up Details: In less than one week        Discharge Follow-up with Specified Provider: NeurologyDr. Friedman; 1 Month   As directed      To: NeurologyDr. Friedman   Follow Up: 1 Month                Pertinent  and/or Most Recent Results     PROCEDURES:   None     LAB RESULTS:      Lab 05/31/25  1517   WBC 10.44   HEMOGLOBIN 16.0   HEMATOCRIT 46.6   PLATELETS 260   NEUTROS ABS 5.22   IMMATURE GRANS (ABS) 0.03   LYMPHS ABS 4.10*   MONOS ABS 0.85   EOS ABS 0.18   MCV 90.1   SED RATE 14   PROTIME 13.4         Lab 06/01/25  0444 05/31/25  1517   SODIUM  --  139   POTASSIUM  --  4.3   CHLORIDE  --  103   CO2  --  26.9   ANION GAP  --  9.1   BUN  --  13.9   CREATININE  --  1.08   EGFR  --  75.7   GLUCOSE   --  116*   CALCIUM  --  9.3   HEMOGLOBIN A1C 6.20*  --          Lab 05/31/25  1517   TOTAL PROTEIN 7.0   ALBUMIN 4.2   GLOBULIN 2.8   ALT (SGPT) 24   AST (SGOT) 20   BILIRUBIN 0.6   ALK PHOS 119*         Lab 05/31/25  1517   PROTIME 13.4   INR 0.98         Lab 06/01/25  0444   CHOLESTEROL 125   LDL CHOL 69   HDL CHOL 26*   TRIGLYCERIDES 174*             Brief Urine Lab Results  (Last result in the past 365 days)        Color   Clarity   Blood   Leuk Est   Nitrite   Protein   CREAT   Urine HCG        05/31/25 1841 Yellow   Clear   Negative   Negative   Negative   Negative                 Microbiology Results (last 10 days)       ** No results found for the last 240 hours. **            MRI Cervical Spine Without Contrast  Result Date: 5/31/2025  Impression: Multilevel degenerative changes. Severe left foraminal narrowing at C5-C6. Severe left foraminal narrowing at C2-C3. Electronically Signed: Partha Garcia MD  5/31/2025 6:43 PM EDT  Workstation ID: [id number]    MRI Brain Without Contrast  Result Date: 5/31/2025  Impression: Impression: MR examination of the brain without IV contrast demonstrating multiple areas of encephalomalacia consistent with old infarcts, stable. No acute intracranial abnormality is seen. Electronically Signed: Marco A Domínguez MD  5/31/2025 6:13 PM EDT  Workstation ID: [id number]    CT Angiogram Head  Result Date: 5/31/2025  Impression: Impression: Mild cervical and intracranial atherosclerotic changes are present as above, without evidence of high-grade stenosis, large vessel occlusion or aneurysm. Electronically Signed: Livan Prather MD  5/31/2025 5:09 PM EDT  Workstation ID: [id number]    CT Angiogram Neck  Result Date: 5/31/2025  Impression: Impression: Mild cervical and intracranial atherosclerotic changes are present as above, without evidence of high-grade stenosis, large vessel occlusion or aneurysm. Electronically Signed: Livan Prather MD  5/31/2025 5:09 PM EDT  Workstation ID:  [id number]    XR Chest 1 View  Result Date: 5/31/2025  Impression: Impression: No active disease is seen. Electronically Signed: Marco A Domínguez MD  5/31/2025 3:16 PM EDT  Workstation ID: [id number]    CT Head Without Contrast  Result Date: 5/31/2025  Impression: Impression: CT scan of the head without IV contrast demonstrating mild atrophy and white matter changes. Areas of encephalomalacia in the head of the caudate nucleus on the left and in the left frontal parietal white matter are consistent with old infarcts. Electronically Signed: Marco A Domínguez MD  5/31/2025 3:09 PM EDT  Workstation ID: [id number]              Results for orders placed during the hospital encounter of 05/31/25    Adult Transthoracic Echo Complete W/ Cont if Necessary Per Protocol (With Agitated Saline)    Interpretation Summary    Left ventricular systolic function is normal. Calculated left ventricular EF = 62.9%    Left ventricular wall thickness is consistent with borderline concentric hypertrophy.    Left ventricular diastolic function was normal.    Saline test results are negative.    The study is technically difficult for diagnosis.      Labs Pending at Discharge:        Time spent on Discharge including face to face service:  36 minutes    Electronically signed by Isai Aceves MD, 06/01/25, 4:27 PM EDT.

## 2025-06-01 NOTE — THERAPY EVALUATION
Acute Care - Physical Therapy Initial Evaluation   [facility]     Patient Name: Sumeet Gallego  : 1960  MRN: [id number]  Today's Date: 2025   Onset of Illness/Injury or Date of Surgery: 25  Visit Dx:     ICD-10-CM ICD-9-CM   1. Right arm weakness  R29.898 729.89   2. Acute ischemic stroke  I63.9 434.91   3. Difficulty in walking  R26.2 719.7     Patient Active Problem List   Diagnosis    Sleep apnea    Seasonal allergies    Ischemic stroke     Past Medical History:   Diagnosis Date    Diabetes mellitus     Hearing loss     Hyperlipidemia     Hypertension     Seasonal allergies     Sleep apnea      Past Surgical History:   Procedure Laterality Date    KIDNEY STONE SURGERY Right      PT Assessment (Last 12 Hours)       PT Evaluation and Treatment       Row Name 25 0919          Physical Therapy Time and Intention    Subjective Information complains of;fatigue  -DW     Document Type evaluation  -DW     Mode of Treatment individual therapy;physical therapy  -DW     Patient Effort excellent  -DW     Symptoms Noted During/After Treatment none  -DW       Row Name 25 0919          General Information    Patient Profile Reviewed yes  -DW     Onset of Illness/Injury or Date of Surgery 25  -DW     Referring Physician Isai Aceves  -DW     Patient Observations alert;cooperative;agree to therapy  -DW     Prior Level of Function independent:;all household mobility;community mobility;ADL's  -DW     Equipment Currently Used at Home none  -DW     Risks Reviewed patient:  -DW     Benefits Reviewed patient:  -DW     Barriers to Rehab none identified  -DW       Row Name 25 0919          Previous Level of Function/Home Environm    BADLs, Previous Functional Level independent  -DW     IADLs, Previous Functional Level independent  -DW     Bed Mobility, Previous Functional Level independent  -DW     Transfers, Previous Functional Level independent  -DW     Household Ambulation, Previous Functional  Level independent  -DW     Community Ambulation, Previous Functional Level independent  -DW       Row Name 06/01/25 0919          Living Environment    Current Living Arrangements home  -DW     People in Home spouse  -DW     Primary Care Provided by self  -       Row Name 06/01/25 0919          Home Use of Assistive/Adaptive Equipment    Equipment Currently Used at Home cpap  -       Row Name 06/01/25 0919          Cognition    Orientation Status (Cognition) oriented x 3  -DW     Follows Commands (Cognition) WNL  -DW     Cognitive Function (Cognition) WNL  -DW       Row Name 06/01/25 0919          Range of Motion Comprehensive    General Range of Motion no range of motion deficits identified  -       Row Name 06/01/25 0919          Strength Comprehensive (MMT)    General Manual Muscle Testing (MMT) Assessment no strength deficits identified  -       Row Name 06/01/25 0919          Bed Mobility    Bed Mobility bed mobility (all) activities  -     All Activities, Heard (Bed Mobility) independent  -DW       Row Name 06/01/25 0919          Transfers    Transfers sit-stand transfer  -       Row Name 06/01/25 0919          Sit-Stand Transfer    Sit-Stand Heard (Transfers) independent  -       Row Name 06/01/25 0919          Gait/Stairs (Locomotion)    Gait/Stairs Locomotion gait/ambulation independence;distance ambulated  -     Heard Level (Gait) independent  -DW     Distance in Feet (Gait) 300  -       Row Name 06/01/25 0919          Balance    Balance Assessment standing dynamic balance  -     Dynamic Standing Balance independent  -DW     Position/Device Used, Standing Balance unsupported  -       Row Name 06/01/25 0919          Plan of Care Review    Plan of Care Reviewed With patient  -     Progress no change  -DW     Outcome Evaluation Pt is at prior level of function therefore no skilled PT intervention is needed at this time.  Pt was independent with ambulation and  transfers.  Will DC PT at this time.  -DW       Row Name 06/01/25 0919          Positioning and Restraints    Pre-Treatment Position in bed  -DW     Post Treatment Position bed  -DW     In Bed sitting EOB  -DW       Row Name 06/01/25 0919          Therapy Assessment/Plan (PT)    PT Diagnosis (PT) Difficulty in walking  -DW     Rehab Potential (PT) good  -DW     Criteria for Skilled Interventions Met (PT) no;no problems identified which require skilled intervention  -DW     Therapy Frequency (PT) evaluation only  -DW       Row Name 06/01/25 0919          PT Evaluation Complexity    History, PT Evaluation Complexity no personal factors and/or comorbidities  -DW     Examination of Body Systems (PT Eval Complexity) 1-2 elements  -DW     Clinical Presentation (PT Evaluation Complexity) stable  -DW     Clinical Decision Making (PT Evaluation Complexity) low complexity  -DW     Overall Complexity (PT Evaluation Complexity) low complexity  -DW       Row Name 06/01/25 0919          Therapy Plan Review/Discharge Plan (PT)    Therapy Plan Review (PT) evaluation/treatment results reviewed  -DW               User Key  (r) = Recorded By, (t) = Taken By, (c) = Cosigned By      Initials Name Provider Type    Aidan Rob, PT Physical Therapist                    Physical Therapy Education       Title: PT OT SLP Therapies (Done)       Topic: Physical Therapy (Done)       Point: Mobility training (Done)       Learning Progress Summary            Patient Acceptance, E,TB, VU by MARY at 6/1/2025 0924                      Point: Home exercise program (Done)       Learning Progress Summary            Patient Acceptance, E,TB, VU by MARY at 6/1/2025 0924                      Point: Body mechanics (Done)       Learning Progress Summary            Patient Acceptance, E,TB, VU by DW at 6/1/2025 0924                      Point: Precautions (Done)       Learning Progress Summary            Patient Acceptance, E,TB, VU by MARY at 6/1/2025 0924                                       User Key       Initials Effective Dates Name Provider Type Discipline     04/25/21 -  Aidan Justice PT Physical Therapist PT                  PT Recommendation and Plan  Anticipated Discharge Disposition (PT): home  Therapy Frequency (PT): evaluation only  Plan of Care Reviewed With: patient  Progress: no change  Outcome Evaluation: Pt is at prior level of function therefore no skilled PT intervention is needed at this time.  Pt was independent with ambulation and transfers.  Will DC PT at this time.   Outcome Measures       Row Name 06/01/25 0924             How much help from another person do you currently need...    Turning from your back to your side while in flat bed without using bedrails? 4  -DW      Moving from lying on back to sitting on the side of a flat bed without bedrails? 4  -DW      Moving to and from a bed to a chair (including a wheelchair)? 4  -DW      Standing up from a chair using your arms (e.g., wheelchair, bedside chair)? 4  -DW      Climbing 3-5 steps with a railing? 4  -DW      To walk in hospital room? 4  -DW      AM-PAC 6 Clicks Score (PT) 24  -DW         Functional Assessment    Outcome Measure Options AM-PAC 6 Clicks Basic Mobility (PT)  -DW                User Key  (r) = Recorded By, (t) = Taken By, (c) = Cosigned By      Initials Name Provider Type    Aidan Rob PT Physical Therapist                     Time Calculation:    PT Charges       Row Name 06/01/25 0925             Time Calculation    PT Received On 06/01/25  -DW         Untimed Charges    PT Eval/Re-eval Minutes 15  -DW         Total Minutes    Untimed Charges Total Minutes 15  -DW       Total Minutes 15  -DW                User Key  (r) = Recorded By, (t) = Taken By, (c) = Cosigned By      Initials Name Provider Type    Aidan Rob PT Physical Therapist                  Therapy Charges for Today       Code Description Service Date Service Provider Modifiers Qty     86383860848 HC PT EVAL LOW COMPLEXITY 2 6/1/2025 Aidan Justice, PT GP 1            PT G-Codes  Outcome Measure Options: AM-PAC 6 Clicks Basic Mobility (PT)  AM-PAC 6 Clicks Score (PT): 24    Aidan Justice, PT  6/1/2025

## 2025-06-01 NOTE — SIGNIFICANT NOTE
06/01/25 0933   OTHER   Discipline speech language pathologist   Rehab Time/Intention   Session Not Performed patient unavailable for evaluation   Recommendations   SLP - Next Appointment 06/02/25     Patient having procedure done. SLP to continue to follow.

## 2025-06-02 ENCOUNTER — TRANSITIONAL CARE MANAGEMENT TELEPHONE ENCOUNTER (OUTPATIENT)
Dept: CALL CENTER | Facility: HOSPITAL | Age: 65
End: 2025-06-02
Payer: MEDICARE

## 2025-06-02 NOTE — OUTREACH NOTE
Call Center TCM Note      Flowsheet Row Responses   [facility] facility patient discharged from? [facility]   Does the patient have one of the following disease processes/diagnoses(primary or secondary)? Stroke   TCM attempt successful? No   Unsuccessful attempts Attempt 2   Call Status --  [wife on release]            Doroteo BARONE - Registered Nurse    6/2/2025, 10:48 EDT

## 2025-06-02 NOTE — OUTREACH NOTE
Call Center TCM Note      Flowsheet Row Responses   [facility] facility patient discharged from? [facility]   Does the patient have one of the following disease processes/diagnoses(primary or secondary)? Stroke   TCM attempt successful? No   Unsuccessful attempts Attempt 1   Call Status Left message  [Wife on release]            Doroteo BARONE - Registered Nurse    6/2/2025, 09:42 EDT

## 2025-06-03 ENCOUNTER — TRANSITIONAL CARE MANAGEMENT TELEPHONE ENCOUNTER (OUTPATIENT)
Dept: CALL CENTER | Facility: HOSPITAL | Age: 65
End: 2025-06-03
Payer: MEDICARE

## 2025-06-03 NOTE — OUTREACH NOTE
Call Center TCM Note      Flowsheet Row Responses   [facility] patient discharged from? [facility]   Does the patient have one of the following disease processes/diagnoses(primary or secondary)? Stroke   TCM attempt successful? No   Unsuccessful attempts Attempt 3  [Attempted to reach patient and wife, Betsey, listed on PCP verbal release. No answer.]            MARIAELENA ASTORGA - Registered Nurse    6/3/2025, 08:13 EDT

## 2025-06-04 ENCOUNTER — OFFICE VISIT (OUTPATIENT)
Dept: FAMILY MEDICINE CLINIC | Facility: CLINIC | Age: 65
End: 2025-06-04
Payer: MEDICARE

## 2025-06-04 VITALS
OXYGEN SATURATION: 95 % | DIASTOLIC BLOOD PRESSURE: 80 MMHG | HEART RATE: 92 BPM | SYSTOLIC BLOOD PRESSURE: 148 MMHG | WEIGHT: 274.8 LBS | BODY MASS INDEX: 40.58 KG/M2 | TEMPERATURE: 99.1 F

## 2025-06-04 DIAGNOSIS — I10 UNCONTROLLED HYPERTENSION: ICD-10-CM

## 2025-06-04 DIAGNOSIS — I69.398 NEUROLOGIC DEFICIT DUE TO OLD ISCHEMIC STROKE: ICD-10-CM

## 2025-06-04 DIAGNOSIS — R29.818 NEUROLOGIC DEFICIT DUE TO OLD ISCHEMIC STROKE: ICD-10-CM

## 2025-06-04 DIAGNOSIS — R20.2 NUMBNESS AND TINGLING: ICD-10-CM

## 2025-06-04 DIAGNOSIS — R20.0 NUMBNESS AND TINGLING: ICD-10-CM

## 2025-06-04 DIAGNOSIS — Z23 NEED FOR COVID-19 VACCINE: Primary | ICD-10-CM

## 2025-06-04 DIAGNOSIS — Z09 HOSPITAL DISCHARGE FOLLOW-UP: ICD-10-CM

## 2025-06-04 PROBLEM — N52.9 ERECTILE DYSFUNCTION: Status: ACTIVE | Noted: 2025-06-04

## 2025-06-04 PROBLEM — E78.5 HLD (HYPERLIPIDEMIA): Status: ACTIVE | Noted: 2025-06-04

## 2025-06-04 PROBLEM — I63.9 ISCHEMIC STROKE: Status: RESOLVED | Noted: 2025-05-31 | Resolved: 2025-06-04

## 2025-06-04 PROBLEM — E11.9 NON-INSULIN DEPENDENT TYPE 2 DIABETES MELLITUS: Status: ACTIVE | Noted: 2025-06-04

## 2025-06-04 PROBLEM — Z86.73 HISTORY OF CVA (CEREBROVASCULAR ACCIDENT): Status: ACTIVE | Noted: 2025-06-04

## 2025-06-04 RX ORDER — PREGABALIN 25 MG/1
25 CAPSULE ORAL 2 TIMES DAILY
Qty: 28 CAPSULE | Refills: 0 | Status: SHIPPED | OUTPATIENT
Start: 2025-06-04

## 2025-06-04 RX ORDER — LOSARTAN POTASSIUM 25 MG/1
25 TABLET ORAL DAILY
COMMUNITY

## 2025-06-04 RX ORDER — CHLORTHALIDONE 25 MG/1
25 TABLET ORAL DAILY
COMMUNITY
End: 2025-06-04

## 2025-06-04 NOTE — PROGRESS NOTES
Transitional Care Follow Up Visit    Subjective     Sumeet Gallego is a 65 y.o. male who presents for a transitional care management visit.    Within 48 business hours after discharge our office contacted him via telephone to coordinate his care and needs.      I reviewed and discussed the details of that call along with the discharge summary, hospital problems, inpatient lab results, inpatient diagnostic studies, and consultation reports with Sumeet.     Current outpatient and discharge medications have been reconciled for the patient.  Reviewed by: Vito Vega DO        6/1/2025     3:55 PM   Date of TCM Phone Call   [facility]   Date of Admission 5/31/2025   Date of Discharge 6/1/2025   Discharge Disposition Home or Self Care     Risk for Readmission (LACE) Score: 2 (6/1/2025  6:00 AM)    Course During Hospital Stay: Patient presents after presenting to [facility] with c/o weakness of rt hand and lower extremity.  Also c/o pain of his rt mastoid region.  Workup was negative for an acute stroke.  Imaging showed areas consistent with old infarcts.  No adjustments made to patient's medications other than discontinuation of chlorthalidone due to episodes of low blood pressure reading - 105/50.  Pt's primary care is provided per [facility].  History of controlled type 2 diabetes, HTN, HLD, sleep apnea.  Patient reports that he continues to experience numbness of his right mastoid region, which he admits is very bothersome.  He also reports discomfort of his right trapezius region + RUE.  Imaging of cervical spine revealed multilevel degenerative changes with areas of severe left foraminal on the left.    Review of Systems   HENT:  Positive for ear pain.    Musculoskeletal:  Positive for myalgias.     Objective   /80 (BP Location: Left arm, Patient Position: Sitting, Cuff Size: Adult)   Pulse 92   Temp 99.1 °F (37.3 °C) (Infrared)   Wt 125 kg (274 lb 12.8 oz)   SpO2  95%   BMI 40.58 kg/m²     Physical Exam  Vitals reviewed.   Constitutional:       General: He is not in acute distress.     Appearance: Normal appearance. He is well-developed.   HENT:      Head: Normocephalic and atraumatic.      Right Ear: Hearing and external ear normal.      Left Ear: Hearing and external ear normal.      Nose: Nose normal.   Eyes:      General: Lids are normal.         Right eye: No discharge.         Left eye: No discharge.      Conjunctiva/sclera: Conjunctivae normal.   Pulmonary:      Effort: Pulmonary effort is normal.   Abdominal:      General: There is no distension.   Musculoskeletal:         General: No swelling.      Cervical back: Neck supple.   Skin:     Coloration: Skin is not jaundiced.      Findings: No erythema.   Neurological:      Mental Status: He is alert. Mental status is at baseline.   Psychiatric:         Mood and Affect: Mood and affect normal.         Thought Content: Thought content normal.     Assessment & Plan   Diagnoses and all orders for this visit:    1. Need for COVID-19 vaccine (Primary)    2. Hospital discharge follow-up  Comments:  Discharge summary reviewed with patient.  Orders:  -     Cancel: Ambulatory Referral to Neurology  -     Ambulatory Referral to Neurology    3. Numbness and tingling  Comments:  ? Shingles vs other pathology. Onset of sxs - > 1 wk. Advised against Valtrex. Short term trial of Lyrica. We expect to follow up in 2 weeks.  Orders:  -     pregabalin (Lyrica) 25 MG capsule; Take 1 capsule by mouth 2 (Two) Times a Day.  Dispense: 28 capsule; Refill: 0  -     Cancel: Ambulatory Referral to Neurology  -     Ambulatory Referral to Neurology    4. Uncontrolled hypertension  Comments:  BP goals discussed. Continue with current dose of ARB. Log BP at home. We will review in 2 weeks. Call if any interim issues.    5. Neurologic deficit due to old ischemic stroke  Comments:  Referral to neurology for an eval and recs.  Orders:  -     Cancel:  Ambulatory Referral to Neurology  -     Ambulatory Referral to Neurology    Other orders  -     COVID-19 (Pfizer) 12yrs+ (COMIRNATY)

## 2025-06-08 LAB
QT INTERVAL: 397 MS
QTC INTERVAL: 430 MS

## 2025-06-13 ENCOUNTER — APPOINTMENT (OUTPATIENT)
Dept: CT IMAGING | Facility: HOSPITAL | Age: 65
End: 2025-06-13
Payer: MEDICARE

## 2025-06-13 ENCOUNTER — HOSPITAL ENCOUNTER (EMERGENCY)
Facility: HOSPITAL | Age: 65
Discharge: HOME | End: 2025-06-13
Attending: EMERGENCY MEDICINE
Payer: MEDICARE

## 2025-06-13 VITALS
DIASTOLIC BLOOD PRESSURE: 77 MMHG | HEART RATE: 68 BPM | TEMPERATURE: 97.6 F | SYSTOLIC BLOOD PRESSURE: 144 MMHG | RESPIRATION RATE: 16 BRPM | BODY MASS INDEX: 39.29 KG/M2 | HEIGHT: 70 IN | WEIGHT: 274.47 LBS | OXYGEN SATURATION: 94 %

## 2025-06-13 DIAGNOSIS — G50.0 TRIGEMINAL NEURALGIA OF RIGHT SIDE OF FACE: Primary | ICD-10-CM

## 2025-06-13 LAB
ALBUMIN SERPL-MCNC: 4.3 G/DL (ref 3.5–5.2)
ALBUMIN/GLOB SERPL: 1.5 G/DL
ALP SERPL-CCNC: 114 U/L (ref 39–117)
ALT SERPL W P-5'-P-CCNC: 21 U/L (ref 1–41)
ANION GAP SERPL CALCULATED.3IONS-SCNC: 10.6 MMOL/L (ref 5–15)
AST SERPL-CCNC: 18 U/L (ref 1–40)
BASOPHILS # BLD AUTO: 0.05 10*3/MM3 (ref 0–0.2)
BASOPHILS NFR BLD AUTO: 0.5 % (ref 0–1.5)
BILIRUB SERPL-MCNC: 0.6 MG/DL (ref 0–1.2)
BUN SERPL-MCNC: 18.7 MG/DL (ref 8–23)
BUN/CREAT SERPL: 18.3 (ref 7–25)
CALCIUM SPEC-SCNC: 9.2 MG/DL (ref 8.6–10.5)
CHLORIDE SERPL-SCNC: 104 MMOL/L (ref 98–107)
CO2 SERPL-SCNC: 24.4 MMOL/L (ref 22–29)
CREAT SERPL-MCNC: 1.02 MG/DL (ref 0.76–1.27)
DEPRECATED RDW RBC AUTO: 43.2 FL (ref 37–54)
EGFRCR SERPLBLD CKD-EPI 2021: 81.1 ML/MIN/1.73
EOSINOPHIL # BLD AUTO: 0.12 10*3/MM3 (ref 0–0.4)
EOSINOPHIL NFR BLD AUTO: 1.1 % (ref 0.3–6.2)
ERYTHROCYTE [DISTWIDTH] IN BLOOD BY AUTOMATED COUNT: 12.5 % (ref 12.3–15.4)
GLOBULIN UR ELPH-MCNC: 2.9 GM/DL
GLUCOSE SERPL-MCNC: 122 MG/DL (ref 65–99)
HCT VFR BLD AUTO: 49.6 % (ref 37.5–51)
HGB BLD-MCNC: 15.9 G/DL (ref 13–17.7)
IMM GRANULOCYTES # BLD AUTO: 0.03 10*3/MM3 (ref 0–0.05)
IMM GRANULOCYTES NFR BLD AUTO: 0.3 % (ref 0–0.5)
LYMPHOCYTES # BLD AUTO: 4.48 10*3/MM3 (ref 0.7–3.1)
LYMPHOCYTES NFR BLD AUTO: 41.3 % (ref 19.6–45.3)
MCH RBC QN AUTO: 30.3 PG (ref 26.6–33)
MCHC RBC AUTO-ENTMCNC: 32.1 G/DL (ref 31.5–35.7)
MCV RBC AUTO: 94.5 FL (ref 79–97)
MONOCYTES # BLD AUTO: 0.7 10*3/MM3 (ref 0.1–0.9)
MONOCYTES NFR BLD AUTO: 6.4 % (ref 5–12)
NEUTROPHILS NFR BLD AUTO: 5.48 10*3/MM3 (ref 1.7–7)
NEUTROPHILS NFR BLD AUTO: 50.4 % (ref 42.7–76)
NRBC BLD AUTO-RTO: 0 /100 WBC (ref 0–0.2)
PLATELET # BLD AUTO: 255 10*3/MM3 (ref 140–450)
PMV BLD AUTO: 10.4 FL (ref 6–12)
POTASSIUM SERPL-SCNC: 4.2 MMOL/L (ref 3.5–5.2)
PROT SERPL-MCNC: 7.2 G/DL (ref 6–8.5)
RBC # BLD AUTO: 5.25 10*6/MM3 (ref 4.14–5.8)
SODIUM SERPL-SCNC: 139 MMOL/L (ref 136–145)
WBC NRBC COR # BLD AUTO: 10.86 10*3/MM3 (ref 3.4–10.8)

## 2025-06-13 PROCEDURE — 36415 COLL VENOUS BLD VENIPUNCTURE: CPT

## 2025-06-13 PROCEDURE — 99284 EMERGENCY DEPT VISIT MOD MDM: CPT

## 2025-06-13 PROCEDURE — 80053 COMPREHEN METABOLIC PANEL: CPT | Performed by: EMERGENCY MEDICINE

## 2025-06-13 PROCEDURE — 85025 COMPLETE CBC W/AUTO DIFF WBC: CPT | Performed by: EMERGENCY MEDICINE

## 2025-06-13 PROCEDURE — 70450 CT HEAD/BRAIN W/O DYE: CPT

## 2025-06-13 RX ORDER — CARBAMAZEPINE 100 MG/1
100 TABLET, EXTENDED RELEASE ORAL 2 TIMES DAILY
Qty: 60 TABLET | Refills: 0 | Status: SHIPPED | OUTPATIENT
Start: 2025-06-13

## 2025-06-13 RX ORDER — CARBAMAZEPINE 100 MG/1
100 TABLET, CHEWABLE ORAL ONCE
Status: COMPLETED | OUTPATIENT
Start: 2025-06-13 | End: 2025-06-13

## 2025-06-13 RX ORDER — HYDROCODONE BITARTRATE AND ACETAMINOPHEN 7.5; 325 MG/1; MG/1
1 TABLET ORAL EVERY 6 HOURS PRN
Qty: 12 TABLET | Refills: 0 | Status: SHIPPED | OUTPATIENT
Start: 2025-06-13

## 2025-06-13 RX ORDER — CARBAMAZEPINE 100 MG/1
100 TABLET, EXTENDED RELEASE ORAL 2 TIMES DAILY
Qty: 10 TABLET | Refills: 0 | Status: SHIPPED | OUTPATIENT
Start: 2025-06-13 | End: 2025-06-18

## 2025-06-13 RX ORDER — HYDROCODONE BITARTRATE AND ACETAMINOPHEN 7.5; 325 MG/1; MG/1
1 TABLET ORAL ONCE
Refills: 0 | Status: COMPLETED | OUTPATIENT
Start: 2025-06-13 | End: 2025-06-13

## 2025-06-13 RX ADMIN — CARBAMAZEPINE 100 MG: 100 TABLET, CHEWABLE ORAL at 23:15

## 2025-06-13 RX ADMIN — HYDROCODONE BITARTRATE AND ACETAMINOPHEN 1 TABLET: 7.5; 325 TABLET ORAL at 23:15

## 2025-06-13 NOTE — ED PROVIDER NOTES
Time: 5:31 PM EDT  Date of encounter:  6/13/2025  Independent Historian/Clinical History and Information was obtained by:   Patient    History is limited by: N/A    Chief Complaint: Right sided head/facial pain      History of Present Illness:  Patient is a 65 y.o. year old male who presents to the emergency department for evaluation of right sided head/facial pain.  Patient reports symptoms began approximately 2 weeks ago.  Patient describes as a sharp shooting pain.  Patient states that last for few seconds at a time that comes every couple of minutes.  Patient reports that when the pain is occurring is almost debilitating.  Patient states the pain comes across the top of his ear through his temple to his periorbital area.  Patient even reports that he has some eye watering.  Patient states he was seen 2 weeks ago when it first started and he was worked up for stroke.  Patient states he feels like they were more worried about stroke and they were approached the cause of the actual pain.  Patient denies any other acute symptoms right now.        Patient Care Team  Primary Care Provider: Vito Vega DO    Past Medical History:     No Known Allergies  Past Medical History:   Diagnosis Date    Diabetes mellitus     Hearing loss     Hyperlipidemia     Hypertension     Seasonal allergies     Sleep apnea      Past Surgical History:   Procedure Laterality Date    KIDNEY STONE SURGERY Right      Family History   Problem Relation Age of Onset    Diabetes type I Mother     Hyperlipidemia Mother     Hypertension Mother     Arthritis Mother     Diabetes type I Sister        Home Medications:  Prior to Admission medications    Medication Sig Start Date End Date Taking? Authorizing Provider   aspirin (aspirin) 81 MG EC tablet Take 1 tablet by mouth Daily.    ProviderTaras MD   atorvastatin (LIPITOR) 80 MG tablet Take 1 tablet by mouth Daily.    ProviderTaras MD   empagliflozin (JARDIANCE) 25 MG tablet tablet  "Take 1 tablet by mouth Daily. Takes 0.5 tab daily    Provider, MD Taras   losartan (COZAAR) 25 MG tablet Take 1 tablet by mouth Daily.    ProviderTaras MD   pregabalin (Lyrica) 25 MG capsule Take 1 capsule by mouth 2 (Two) Times a Day. 6/4/25   Vito Vega DO   sildenafil (VIAGRA) 100 MG tablet Take 1 tablet by mouth As Needed for Erectile Dysfunction. 9/16/22   Vito Vega DO        Social History:   Social History     Tobacco Use    Smoking status: Never    Smokeless tobacco: Never   Vaping Use    Vaping status: Never Used   Substance Use Topics    Alcohol use: Yes     Comment: 7 or less drinks per week    Drug use: Never         Review of Systems:  Review of Systems   Constitutional:  Negative for chills and fever.   HENT:  Negative for congestion, ear pain and sore throat.    Eyes:  Negative for pain.   Respiratory:  Negative for cough, chest tightness and shortness of breath.    Cardiovascular:  Negative for chest pain.   Gastrointestinal:  Negative for abdominal pain, diarrhea, nausea and vomiting.   Genitourinary:  Negative for flank pain and hematuria.   Musculoskeletal:  Negative for joint swelling.   Skin:  Negative for pallor.   Neurological:  Positive for numbness and headaches. Negative for seizures.   All other systems reviewed and are negative.       Physical Exam:  /76 (BP Location: Right arm, Patient Position: Lying)   Pulse 63   Temp 98.2 °F (36.8 °C) (Oral)   Resp 18   Ht 177.8 cm (70\")   Wt 124 kg (274 lb 7.6 oz)   SpO2 94%   BMI 39.38 kg/m²   Vital signs were reviewed under triage note.  General appearance - Patient appears well-developed and well-nourished.  Patient is in no acute distress.  Patient will have episodes of grimacing pain when he holds his right temple area with his hand.  Head - Normocephalic, atraumatic.  Pupils - Equal, round, reactive to light.  Extraocular muscles are intact.  Conjunctiva is clear.  Nasal - Normal inspection.  No evidence of " trauma or epistaxis.  Tympanic membranes - Gray, intact without erythema or retractions.  Oral mucosa - Pink and moist without lesions or erythema.  Uvula is midline.  Chest wall - Atraumatic.  Chest wall is nontender.  There are no vesicular rashes noted.  Neck - Supple.  Trachea was midline.  There is no palpable lymphadenopathy or thyromegaly.  There are no meningeal signs  Lungs - Clear to auscultation and percussion bilaterally.  Heart - Regular rate and rhythm without any murmurs, clicks, or gallops.  Abdomen - Soft.  Bowel sounds are present.  There is no palpable tenderness.  There is no rebound, guarding, or rigidity.  There are no palpable masses.  There are no pulsatile masses.  Back - Spine is straight and midline.  There is no CVA tenderness.  Extremities - Intact x4 with full range of motion.  There is no palpable edema.  Pulses are intact x4 and equal.  Neurologic - Patient is awake, alert, and oriented x3.  Cranial nerves II through XII are grossly intact.  Motor and sensory functions grossly intact.  Cerebellar function was normal.  Integument - There are no rashes.  There are no petechia or purpura lesions noted.  There are no vesicular lesions noted.              Medical Decision Making:      Comorbidities that affect care:    Sleep apnea, hypertension, hyperlipidemia, diabetes    External Notes reviewed:    Previous Clinic Note: Office visit with Dr. Vega on 6/4/2025 was reviewed by me.  He      The following orders were placed and all results were independently analyzed by me:  Orders Placed This Encounter   Procedures    CT Head Without Contrast    Comprehensive Metabolic Panel    CBC Auto Differential    CBC & Differential       Medications Given in the Emergency Department:  Medications   HYDROcodone-acetaminophen (NORCO) 7.5-325 MG per tablet 1 tablet (has no administration in time range)   carBAMazepine (TEGretol) chewable tablet 100 mg (has no administration in time range)        ED  Course:    ED Course as of 06/13/25 2225 Fri Jun 13, 2025   1732 PROVIDER IN TRIAGE  Patient was evaluated by me in triage, Bailey Seaver, APRN, NOELLE.  Orders were placed and patient is currently awaiting final results and disposition.   [AS]      ED Course User Index  [AS] Seaver, Alyce B, APRN       The patient was seen and evaluated in the ED by me.  The above history and physical examination was performed as documented.  Diagnostic data was obtained.  Results reviewed.  Findings were discussed with the patient.  Based on the patient's history and physical findings I believe the patient has trigeminal neuralgia/tic douloureux.  I did review the patient's workup today in the ER which was unremarkable.  I also reviewed his recent hospitalization.  Patient did have a sed rate performed during his hospitalization was only 14.  MRI showed evidence of old strokes but no acute stroke.  Patient has no other acute neurological findings at this time.  I feel confident that this is trigeminal neuralgia will start him onto the Tegretol and I will give him some breakthrough pain medications right now.    Labs:    Lab Results (last 24 hours)       Procedure Component Value Units Date/Time    CBC & Differential [[id number]]  (Abnormal) Collected: 06/13/25 2029    Specimen: Blood Updated: 06/13/25 2036    Narrative:      The following orders were created for panel order CBC & Differential.  Procedure                               Abnormality         Status                     ---------                               -----------         ------                     CBC Auto Differential[[id number]]        Abnormal            Final result                 Please view results for these tests on the individual orders.    Comprehensive Metabolic Panel [[id number]]  (Abnormal) Collected: 06/13/25 2029    Specimen: Blood Updated: 06/13/25 2107     Glucose 122 mg/dL      BUN 18.7 mg/dL      Creatinine 1.02 mg/dL      Sodium 139 mmol/L       Potassium 4.2 mmol/L      Chloride 104 mmol/L      CO2 24.4 mmol/L      Calcium 9.2 mg/dL      Total Protein 7.2 g/dL      Albumin 4.3 g/dL      ALT (SGPT) 21 U/L      AST (SGOT) 18 U/L      Alkaline Phosphatase 114 U/L      Total Bilirubin 0.6 mg/dL      Globulin 2.9 gm/dL      A/G Ratio 1.5 g/dL      BUN/Creatinine Ratio 18.3     Anion Gap 10.6 mmol/L      eGFR 81.1 mL/min/1.73     Narrative:      GFR Categories in Chronic Kidney Disease (CKD)              GFR Category          GFR (mL/min/1.73)    Interpretation  G1                    90 or greater        Normal or high (1)  G2                    60-89                Mild decrease (1)  G3a                   45-59                Mild to moderate decrease  G3b                   30-44                Moderate to severe decrease  G4                    15-29                Severe decrease  G5                    14 or less           Kidney failure    (1)In the absence of evidence of kidney disease, neither GFR category G1 or G2 fulfill the criteria for CKD.    eGFR calculation 2021 CKD-EPI creatinine equation, which does not include race as a factor    CBC Auto Differential [[id number]]  (Abnormal) Collected: 06/13/25 2029    Specimen: Blood Updated: 06/13/25 2036     WBC 10.86 10*3/mm3      RBC 5.25 10*6/mm3      Hemoglobin 15.9 g/dL      Hematocrit 49.6 %      MCV 94.5 fL      MCH 30.3 pg      MCHC 32.1 g/dL      RDW 12.5 %      RDW-SD 43.2 fl      MPV 10.4 fL      Platelets 255 10*3/mm3      Neutrophil % 50.4 %      Lymphocyte % 41.3 %      Monocyte % 6.4 %      Eosinophil % 1.1 %      Basophil % 0.5 %      Immature Grans % 0.3 %      Neutrophils, Absolute 5.48 10*3/mm3      Lymphocytes, Absolute 4.48 10*3/mm3      Monocytes, Absolute 0.70 10*3/mm3      Eosinophils, Absolute 0.12 10*3/mm3      Basophils, Absolute 0.05 10*3/mm3      Immature Grans, Absolute 0.03 10*3/mm3      nRBC 0.0 /100 WBC              Imaging:    CT Head Without Contrast  Result Date:  6/13/2025  CT HEAD WO CONTRAST Date of Exam: 6/13/2025 6:01 PM EDT Indication: headache. Comparison: Brain MRI 5/31/2025. Head CT 5/31/2025. Technique: Axial CT images were obtained of the head without contrast administration.  Reconstructed coronal and sagittal images were also obtained. Automated exposure control and iterative construction methods were used. Findings: No acute intracranial hemorrhage.Intact appearing gray-white differentiation.No extra-axial fluid collection.No significant mass effect. Chronic lacunar infarcts in the bilateral deep white matter, right basal ganglia, and left cerebellar hemisphere. No hydrocephalus. Mild generalized parenchymal volume loss. Scattered areas of periventricular and subcortical white matter hypoattenuation, nonspecific, perhaps from small vessel ischemic/hypertensive changes in a patient of this age.There are intracranial atherosclerotic calcifications. Mild scattered mucosal thickening in the paranasal sinuses.Mastoid air cells are essentially clear.Included globes and orbits appear unremarkable by CT. No acute or aggressive appearing bony or extracranial soft tissue process.     Impression: No acute intracranial findings. Electronically Signed: Ishan Michele MD  6/13/2025 6:24 PM EDT  Workstation ID: [id number]        Differential Diagnosis and Discussion:    Stroke: Differential diagnosis in this patient with signs of possible ischemic stroke include TIA or ischemic stroke, hemorrhagic stroke, hypoglycemic episode, toxic or metabolic encephalopathy, paresthesias.    PROCEDURES:    Labs were collected in the emergency department and all labs were reviewed and interpreted by me.  CT scan was performed in the emergency department and the CT scan radiology impression was interpreted by me.    No orders to display       Procedures    MDM     Amount and/or Complexity of Data Reviewed  Clinical lab tests: reviewed  Tests in the radiology section of CPT®: reviewed                        Patient Care Considerations:    ANTIBIOTICS: I considered prescribing antibiotics as an outpatient however no bacterial focus of infection was found.      Consultants/Shared Management Plan:    None    Social Determinants of Health:    Patient is independent, reliable, and has access to care.       Disposition and Care Coordination:    Discharged: I considered escalation of care by admitting this patient to the hospital, however there were no acute findings to warrant inpatient admission.    I have explained the patient´s condition, diagnoses and treatment plan based on the information available to me at this time. I have answered questions and addressed any concerns. The patient has a good  understanding of the patient´s diagnosis, condition, and treatment plan as can be expected at this point. The vital signs have been stable. The patient´s condition is stable and appropriate for discharge from the emergency department.      The patient will pursue further outpatient evaluation with the primary care physician or other designated or consulting physician as outlined in the discharge instructions. They are agreeable to this plan of care and follow-up instructions have been explained in detail. The patient has received these instructions in written format and has expressed an understanding of the discharge instructions. The patient is aware that any significant change in condition or worsening of symptoms should prompt an immediate return to this or the closest emergency department or call to 911.  I have explained discharge medications and the need for follow up with the patient/caretakers. This was also printed in the discharge instructions. Patient was discharged with the following medications and follow up:      Medication List        New Prescriptions      * carBAMazepine  MG 12 hr tablet  Commonly known as: TEGretol  XR  Take 1 tablet by mouth 2 (Two) Times a Day for 5 days.     *  carBAMazepine  MG 12 hr tablet  Commonly known as: TEGretol-XR  Take 1 tablet by mouth 2 (Two) Times a Day.     HYDROcodone-acetaminophen 7.5-325 MG per tablet  Commonly known as: NORCO  Take 1 tablet by mouth Every 6 (Six) Hours As Needed for Moderate Pain.           * This list has 2 medication(s) that are the same as other medications prescribed for you. Read the directions carefully, and ask your doctor or other care provider to review them with you.                   Where to Get Your Medications        These medications were sent to [facility]/pharmacy #[id number] - [place], [place] - [address] - [phone]  - [phone]   [address] [place] [address]      Hours: 24-hours Phone: [phone]   carBAMazepine  MG 12 hr tablet  HYDROcodone-acetaminophen 7.5-325 MG per tablet       These medications were sent to [facility] - [facility] [address] - [phone] [facility] [phone]   [address] [facility] [address]      Phone: [phone]   carBAMazepine  MG 12 hr tablet      Vito Vega DO  [address] [place] [address] [phone]      As needed      Follow-up with your neurologist this week as scheduled.  Discuss your findings and my diagnosis with him.          Final diagnoses:   Trigeminal neuralgia of right side of face        ED Disposition       ED Disposition   Discharge    Condition   Stable    Comment   --               This medical record created using voice recognition software.             Jamie Mckeon DO  06/14/25 1938

## 2025-06-14 NOTE — DISCHARGE INSTRUCTIONS
Take the Tegretol as directed.  Take the hydrocodone as needed for severe pain.  Caution as this will cause constipation so consider taking a stool softener.  Continue the other home medications as prescribed.  Follow-up with your primary care provider as needed.  Follow-up with your neurologist this week as scheduled.  Return to the ER for any new symptoms or any other concerns issues that may arise.

## 2025-08-14 ENCOUNTER — RESULTS FOLLOW-UP (OUTPATIENT)
Dept: FAMILY MEDICINE CLINIC | Facility: CLINIC | Age: 65
End: 2025-08-14

## 2025-08-14 ENCOUNTER — OFFICE VISIT (OUTPATIENT)
Dept: FAMILY MEDICINE CLINIC | Facility: CLINIC | Age: 65
End: 2025-08-14
Payer: MEDICARE

## 2025-08-14 VITALS
SYSTOLIC BLOOD PRESSURE: 140 MMHG | DIASTOLIC BLOOD PRESSURE: 82 MMHG | BODY MASS INDEX: 35.9 KG/M2 | HEART RATE: 95 BPM | OXYGEN SATURATION: 99 % | WEIGHT: 250.2 LBS | TEMPERATURE: 98.2 F

## 2025-08-14 DIAGNOSIS — S13.9XXA NECK SPRAIN, INITIAL ENCOUNTER: ICD-10-CM

## 2025-08-14 DIAGNOSIS — M54.2 NECK PAIN: Primary | ICD-10-CM

## 2025-08-14 RX ORDER — CARBAMAZEPINE 200 MG/1
200 TABLET, EXTENDED RELEASE ORAL 2 TIMES DAILY
COMMUNITY
Start: 2025-08-05

## 2025-08-14 RX ORDER — CYCLOBENZAPRINE HCL 10 MG
10 TABLET ORAL NIGHTLY PRN
Qty: 30 TABLET | Refills: 0 | Status: SHIPPED | OUTPATIENT
Start: 2025-08-14

## 2025-08-22 ENCOUNTER — OFFICE VISIT (OUTPATIENT)
Dept: FAMILY MEDICINE CLINIC | Facility: CLINIC | Age: 65
End: 2025-08-22
Payer: MEDICARE

## 2025-08-22 VITALS
TEMPERATURE: 97.8 F | WEIGHT: 258.7 LBS | SYSTOLIC BLOOD PRESSURE: 118 MMHG | HEIGHT: 70 IN | BODY MASS INDEX: 37.03 KG/M2 | OXYGEN SATURATION: 95 % | DIASTOLIC BLOOD PRESSURE: 74 MMHG | HEART RATE: 91 BPM

## 2025-08-22 DIAGNOSIS — Z79.899 HIGH RISK MEDICATION USE: ICD-10-CM

## 2025-08-22 DIAGNOSIS — E11.9 ENCOUNTER FOR DIABETIC FOOT EXAM: ICD-10-CM

## 2025-08-22 DIAGNOSIS — Z12.5 PROSTATE CANCER SCREENING: ICD-10-CM

## 2025-08-22 DIAGNOSIS — Z00.00 MEDICARE ANNUAL WELLNESS VISIT, SUBSEQUENT: Primary | ICD-10-CM

## 2025-08-22 DIAGNOSIS — E11.9 NON-INSULIN DEPENDENT TYPE 2 DIABETES MELLITUS: ICD-10-CM

## 2025-08-22 DIAGNOSIS — K59.00 CONSTIPATION, UNSPECIFIED CONSTIPATION TYPE: ICD-10-CM

## 2025-08-22 LAB
ALBUMIN SERPL-MCNC: 4 G/DL (ref 3.5–5.2)
ALBUMIN UR-MCNC: 1.6 MG/DL
ALBUMIN/GLOB SERPL: 1.3 G/DL
ALP SERPL-CCNC: 125 U/L (ref 39–117)
ALT SERPL W P-5'-P-CCNC: 24 U/L (ref 1–41)
ANION GAP SERPL CALCULATED.3IONS-SCNC: 12.1 MMOL/L (ref 5–15)
AST SERPL-CCNC: 22 U/L (ref 1–40)
BILIRUB SERPL-MCNC: 0.4 MG/DL (ref 0–1.2)
BUN SERPL-MCNC: 13 MG/DL (ref 8–23)
BUN/CREAT SERPL: 14.6 (ref 7–25)
CALCIUM SPEC-SCNC: 9.4 MG/DL (ref 8.6–10.5)
CHLORIDE SERPL-SCNC: 105 MMOL/L (ref 98–107)
CO2 SERPL-SCNC: 25.9 MMOL/L (ref 22–29)
CREAT SERPL-MCNC: 0.89 MG/DL (ref 0.76–1.27)
CREAT UR-MCNC: 186.5 MG/DL
EGFRCR SERPLBLD CKD-EPI 2021: 94.5 ML/MIN/1.73
GLOBULIN UR ELPH-MCNC: 3 GM/DL
GLUCOSE SERPL-MCNC: 135 MG/DL (ref 65–99)
HCV AB SER QL: NORMAL
MICROALBUMIN/CREAT UR: 8.6 MG/G (ref 0–29)
POTASSIUM SERPL-SCNC: 4.5 MMOL/L (ref 3.5–5.2)
PROT SERPL-MCNC: 7 G/DL (ref 6–8.5)
PSA SERPL-MCNC: 0.87 NG/ML (ref 0–4)
SODIUM SERPL-SCNC: 143 MMOL/L (ref 136–145)

## 2025-08-22 RX ORDER — DOCUSATE SODIUM 100 MG/1
100 CAPSULE, LIQUID FILLED ORAL 3 TIMES DAILY PRN
Qty: 60 CAPSULE | Refills: 0 | Status: SHIPPED | OUTPATIENT
Start: 2025-08-22

## 2025-08-26 ENCOUNTER — RESULTS FOLLOW-UP (OUTPATIENT)
Dept: FAMILY MEDICINE CLINIC | Facility: CLINIC | Age: 65
End: 2025-08-26
Payer: MEDICARE